# Patient Record
Sex: FEMALE | Race: WHITE | NOT HISPANIC OR LATINO | Employment: FULL TIME | URBAN - METROPOLITAN AREA
[De-identification: names, ages, dates, MRNs, and addresses within clinical notes are randomized per-mention and may not be internally consistent; named-entity substitution may affect disease eponyms.]

---

## 2018-01-25 ENCOUNTER — TRANSCRIBE ORDERS (OUTPATIENT)
Dept: ADMINISTRATIVE | Facility: HOSPITAL | Age: 58
End: 2018-01-25

## 2018-01-25 DIAGNOSIS — R10.10 PAIN OF UPPER ABDOMEN: Primary | ICD-10-CM

## 2018-01-25 DIAGNOSIS — R11.0 NAUSEA: ICD-10-CM

## 2018-01-29 ENCOUNTER — TRANSCRIBE ORDERS (OUTPATIENT)
Dept: ADMINISTRATIVE | Facility: HOSPITAL | Age: 58
End: 2018-01-29

## 2018-01-29 DIAGNOSIS — M25.512 ACUTE PAIN OF LEFT SHOULDER: Primary | ICD-10-CM

## 2018-02-01 ENCOUNTER — HOSPITAL ENCOUNTER (OUTPATIENT)
Dept: CT IMAGING | Facility: HOSPITAL | Age: 58
Discharge: HOME | End: 2018-02-01
Attending: ORTHOPAEDIC SURGERY | Admitting: ORTHOPAEDIC SURGERY

## 2018-02-01 DIAGNOSIS — M25.512 ACUTE PAIN OF LEFT SHOULDER: ICD-10-CM

## 2018-02-01 PROCEDURE — 73200 CT UPPER EXTREMITY W/O DYE: CPT

## 2018-03-30 ENCOUNTER — HOSPITAL ENCOUNTER (OUTPATIENT)
Dept: GENERAL RADIOLOGY | Facility: HOSPITAL | Age: 58
Discharge: HOME | End: 2018-03-30
Admitting: ORTHOPAEDIC SURGERY

## 2018-03-30 ENCOUNTER — HOSPITAL ENCOUNTER (OUTPATIENT)
Dept: GENERAL RADIOLOGY | Facility: HOSPITAL | Age: 58
Discharge: HOME | End: 2018-03-30

## 2018-03-30 ENCOUNTER — APPOINTMENT (OUTPATIENT)
Dept: PREADMISSION TESTING | Facility: HOSPITAL | Age: 58
End: 2018-03-30

## 2018-03-30 VITALS
WEIGHT: 134 LBS | DIASTOLIC BLOOD PRESSURE: 79 MMHG | OXYGEN SATURATION: 99 % | TEMPERATURE: 97.8 F | RESPIRATION RATE: 16 BRPM | HEART RATE: 78 BPM | HEIGHT: 65 IN | BODY MASS INDEX: 22.33 KG/M2 | SYSTOLIC BLOOD PRESSURE: 107 MMHG

## 2018-03-30 PROCEDURE — 71046 X-RAY EXAM CHEST 2 VIEWS: CPT

## 2018-03-30 PROCEDURE — 73030 X-RAY EXAM OF SHOULDER: CPT

## 2018-03-30 RX ORDER — CLONAZEPAM 1 MG/1
1 TABLET ORAL 2 TIMES DAILY PRN
COMMUNITY
End: 2021-07-09

## 2018-03-30 RX ORDER — MELATONIN
1000 EVERY MORNING
COMMUNITY
End: 2021-07-09

## 2018-03-30 RX ORDER — UBIDECARENONE 75 MG
50 CAPSULE ORAL EVERY MORNING
COMMUNITY
End: 2021-07-09

## 2018-03-31 PROBLEM — M19.012 PRIMARY OSTEOARTHRITIS OF LEFT SHOULDER: Chronic | Status: ACTIVE | Noted: 2018-03-31

## 2018-04-02 DIAGNOSIS — M19.90 OTHER TYPE OF OSTEOARTHRITIS, UNSPECIFIED SITE: Primary | ICD-10-CM

## 2018-04-02 LAB
APTT PPP: 28.6 SECONDS (ref 22.7–35.4)
INR PPP: 1.04 (ref 0.9–1.1)
PROTHROMBIN TIME: 13.4 SECONDS (ref 11.7–14.2)

## 2018-04-02 PROCEDURE — 85730 THROMBOPLASTIN TIME PARTIAL: CPT | Performed by: ORTHOPAEDIC SURGERY

## 2018-04-02 PROCEDURE — 36415 COLL VENOUS BLD VENIPUNCTURE: CPT

## 2018-04-02 PROCEDURE — 85610 PROTHROMBIN TIME: CPT | Performed by: ORTHOPAEDIC SURGERY

## 2018-04-09 RX ORDER — CEFAZOLIN SODIUM 2 G/100ML
2 INJECTION, SOLUTION INTRAVENOUS ONCE
Status: DISCONTINUED | OUTPATIENT
Start: 2018-04-09 | End: 2018-04-09 | Stop reason: SDUPTHER

## 2018-04-10 ENCOUNTER — HOSPITAL ENCOUNTER (INPATIENT)
Facility: HOSPITAL | Age: 58
LOS: 1 days | Discharge: HOME | End: 2018-04-11
Attending: ORTHOPAEDIC SURGERY | Admitting: ORTHOPAEDIC SURGERY

## 2018-04-10 ENCOUNTER — APPOINTMENT (OUTPATIENT)
Dept: GENERAL RADIOLOGY | Facility: HOSPITAL | Age: 58
End: 2018-04-10

## 2018-04-10 ENCOUNTER — ANESTHESIA (OUTPATIENT)
Dept: PERIOP | Facility: HOSPITAL | Age: 58
End: 2018-04-10

## 2018-04-10 ENCOUNTER — ANESTHESIA EVENT (OUTPATIENT)
Dept: PERIOP | Facility: HOSPITAL | Age: 58
End: 2018-04-10

## 2018-04-10 DIAGNOSIS — M19.012 PRIMARY OSTEOARTHRITIS OF LEFT SHOULDER: Primary | Chronic | ICD-10-CM

## 2018-04-10 PROBLEM — F41.9 ANXIETY: Status: ACTIVE | Noted: 2018-04-10

## 2018-04-10 PROBLEM — Z98.890 POSTOPERATIVE STATE: Status: ACTIVE | Noted: 2018-04-10

## 2018-04-10 PROCEDURE — C1713 ANCHOR/SCREW BN/BN,TIS/BN: HCPCS | Performed by: ORTHOPAEDIC SURGERY

## 2018-04-10 PROCEDURE — 25010000003 CEFAZOLIN IN DEXTROSE 2-4 GM/100ML-% SOLUTION: Performed by: ORTHOPAEDIC SURGERY

## 2018-04-10 PROCEDURE — C1776 JOINT DEVICE (IMPLANTABLE): HCPCS | Performed by: ORTHOPAEDIC SURGERY

## 2018-04-10 PROCEDURE — 25010000002 SUCCINYLCHOLINE PER 20 MG: Performed by: NURSE ANESTHETIST, CERTIFIED REGISTERED

## 2018-04-10 PROCEDURE — 25010000002 DEXAMETHASONE PER 1 MG: Performed by: NURSE ANESTHETIST, CERTIFIED REGISTERED

## 2018-04-10 PROCEDURE — 25010000002 ONDANSETRON PER 1 MG: Performed by: NURSE ANESTHETIST, CERTIFIED REGISTERED

## 2018-04-10 PROCEDURE — 25010000002 FENTANYL CITRATE (PF) 100 MCG/2ML SOLUTION: Performed by: ANESTHESIOLOGY

## 2018-04-10 PROCEDURE — 25010000002 MIDAZOLAM PER 1 MG: Performed by: ANESTHESIOLOGY

## 2018-04-10 PROCEDURE — 73020 X-RAY EXAM OF SHOULDER: CPT

## 2018-04-10 PROCEDURE — 0RRK0JZ REPLACEMENT OF LEFT SHOULDER JOINT WITH SYNTHETIC SUBSTITUTE, OPEN APPROACH: ICD-10-PCS | Performed by: ORTHOPAEDIC SURGERY

## 2018-04-10 PROCEDURE — 25010000002 PROPOFOL 10 MG/ML EMULSION: Performed by: NURSE ANESTHETIST, CERTIFIED REGISTERED

## 2018-04-10 PROCEDURE — 25010000002 PHENYLEPHRINE PER 1 ML: Performed by: NURSE ANESTHETIST, CERTIFIED REGISTERED

## 2018-04-10 DEVICE — IMPLANTABLE DEVICE: Type: IMPLANTABLE DEVICE | Site: SHOULDER | Status: FUNCTIONAL

## 2018-04-10 DEVICE — IMPLANTABLE DEVICE
Type: IMPLANTABLE DEVICE | Site: SHOULDER | Status: FUNCTIONAL
Brand: FLEX SHOULDER SYSTEM

## 2018-04-10 DEVICE — HALF DOSE BONE CEMENT, 10 PACK CATALOG NUMBER IS 6188-1-010
Type: IMPLANTABLE DEVICE | Site: SHOULDER | Status: FUNCTIONAL
Brand: SIMPLEX

## 2018-04-10 DEVICE — IMPLANTABLE DEVICE
Type: IMPLANTABLE DEVICE | Site: SHOULDER | Status: FUNCTIONAL
Brand: AEQUALIS™ ASCEND™ FLEX

## 2018-04-10 RX ORDER — MEPERIDINE HYDROCHLORIDE 25 MG/ML
12.5 INJECTION INTRAMUSCULAR; INTRAVENOUS; SUBCUTANEOUS
Status: DISCONTINUED | OUTPATIENT
Start: 2018-04-10 | End: 2018-04-10 | Stop reason: HOSPADM

## 2018-04-10 RX ORDER — PROMETHAZINE HYDROCHLORIDE 25 MG/1
25 TABLET ORAL ONCE AS NEEDED
Status: DISCONTINUED | OUTPATIENT
Start: 2018-04-10 | End: 2018-04-10 | Stop reason: HOSPADM

## 2018-04-10 RX ORDER — MIDAZOLAM HYDROCHLORIDE 1 MG/ML
1 INJECTION INTRAMUSCULAR; INTRAVENOUS
Status: DISCONTINUED | OUTPATIENT
Start: 2018-04-10 | End: 2018-04-10 | Stop reason: HOSPADM

## 2018-04-10 RX ORDER — HYDROCODONE BITARTRATE AND ACETAMINOPHEN 7.5; 325 MG/1; MG/1
2 TABLET ORAL EVERY 4 HOURS PRN
Status: DISCONTINUED | OUTPATIENT
Start: 2018-04-10 | End: 2018-04-11 | Stop reason: HOSPADM

## 2018-04-10 RX ORDER — ROCURONIUM BROMIDE 10 MG/ML
INJECTION, SOLUTION INTRAVENOUS AS NEEDED
Status: DISCONTINUED | OUTPATIENT
Start: 2018-04-10 | End: 2018-04-10 | Stop reason: SURG

## 2018-04-10 RX ORDER — PROPOFOL 10 MG/ML
VIAL (ML) INTRAVENOUS AS NEEDED
Status: DISCONTINUED | OUTPATIENT
Start: 2018-04-10 | End: 2018-04-10 | Stop reason: SURG

## 2018-04-10 RX ORDER — ONDANSETRON 4 MG/1
4 TABLET, FILM COATED ORAL EVERY 6 HOURS PRN
Status: DISCONTINUED | OUTPATIENT
Start: 2018-04-10 | End: 2018-04-11 | Stop reason: HOSPADM

## 2018-04-10 RX ORDER — ONDANSETRON 2 MG/ML
INJECTION INTRAMUSCULAR; INTRAVENOUS AS NEEDED
Status: DISCONTINUED | OUTPATIENT
Start: 2018-04-10 | End: 2018-04-10 | Stop reason: SURG

## 2018-04-10 RX ORDER — LABETALOL HYDROCHLORIDE 5 MG/ML
5 INJECTION, SOLUTION INTRAVENOUS
Status: DISCONTINUED | OUTPATIENT
Start: 2018-04-10 | End: 2018-04-10 | Stop reason: HOSPADM

## 2018-04-10 RX ORDER — PROMETHAZINE HYDROCHLORIDE 25 MG/1
25 SUPPOSITORY RECTAL ONCE AS NEEDED
Status: DISCONTINUED | OUTPATIENT
Start: 2018-04-10 | End: 2018-04-10 | Stop reason: HOSPADM

## 2018-04-10 RX ORDER — SODIUM CHLORIDE 0.9 % (FLUSH) 0.9 %
1-10 SYRINGE (ML) INJECTION AS NEEDED
Status: DISCONTINUED | OUTPATIENT
Start: 2018-04-10 | End: 2018-04-10 | Stop reason: HOSPADM

## 2018-04-10 RX ORDER — IPRATROPIUM BROMIDE AND ALBUTEROL SULFATE 2.5; .5 MG/3ML; MG/3ML
3 SOLUTION RESPIRATORY (INHALATION) ONCE AS NEEDED
Status: DISCONTINUED | OUTPATIENT
Start: 2018-04-10 | End: 2018-04-10 | Stop reason: HOSPADM

## 2018-04-10 RX ORDER — DOCUSATE SODIUM 100 MG/1
100 CAPSULE, LIQUID FILLED ORAL 2 TIMES DAILY PRN
Status: DISCONTINUED | OUTPATIENT
Start: 2018-04-10 | End: 2018-04-11 | Stop reason: HOSPADM

## 2018-04-10 RX ORDER — HYDROCODONE BITARTRATE AND ACETAMINOPHEN 7.5; 325 MG/1; MG/1
1 TABLET ORAL ONCE AS NEEDED
Status: DISCONTINUED | OUTPATIENT
Start: 2018-04-10 | End: 2018-04-10 | Stop reason: HOSPADM

## 2018-04-10 RX ORDER — SODIUM CHLORIDE, SODIUM LACTATE, POTASSIUM CHLORIDE, CALCIUM CHLORIDE 600; 310; 30; 20 MG/100ML; MG/100ML; MG/100ML; MG/100ML
100 INJECTION, SOLUTION INTRAVENOUS CONTINUOUS
Status: DISCONTINUED | OUTPATIENT
Start: 2018-04-10 | End: 2018-04-11 | Stop reason: HOSPADM

## 2018-04-10 RX ORDER — PROMETHAZINE HYDROCHLORIDE 25 MG/ML
5 INJECTION, SOLUTION INTRAMUSCULAR; INTRAVENOUS
Status: DISCONTINUED | OUTPATIENT
Start: 2018-04-10 | End: 2018-04-10 | Stop reason: HOSPADM

## 2018-04-10 RX ORDER — HYDROCODONE BITARTRATE AND ACETAMINOPHEN 7.5; 325 MG/1; MG/1
1 TABLET ORAL EVERY 4 HOURS PRN
Qty: 60 TABLET | Refills: 0 | Status: SHIPPED | OUTPATIENT
Start: 2018-04-10 | End: 2018-04-20

## 2018-04-10 RX ORDER — SODIUM CHLORIDE, SODIUM LACTATE, POTASSIUM CHLORIDE, CALCIUM CHLORIDE 600; 310; 30; 20 MG/100ML; MG/100ML; MG/100ML; MG/100ML
9 INJECTION, SOLUTION INTRAVENOUS CONTINUOUS
Status: DISCONTINUED | OUTPATIENT
Start: 2018-04-10 | End: 2018-04-10 | Stop reason: HOSPADM

## 2018-04-10 RX ORDER — DIPHENHYDRAMINE HCL 25 MG
25 CAPSULE ORAL EVERY 6 HOURS PRN
Status: DISCONTINUED | OUTPATIENT
Start: 2018-04-10 | End: 2018-04-11 | Stop reason: HOSPADM

## 2018-04-10 RX ORDER — PROMETHAZINE HYDROCHLORIDE 25 MG/ML
12.5 INJECTION, SOLUTION INTRAMUSCULAR; INTRAVENOUS ONCE AS NEEDED
Status: DISCONTINUED | OUTPATIENT
Start: 2018-04-10 | End: 2018-04-10 | Stop reason: HOSPADM

## 2018-04-10 RX ORDER — HYDROCODONE BITARTRATE AND ACETAMINOPHEN 7.5; 325 MG/1; MG/1
1 TABLET ORAL EVERY 4 HOURS PRN
Status: DISCONTINUED | OUTPATIENT
Start: 2018-04-10 | End: 2018-04-11 | Stop reason: HOSPADM

## 2018-04-10 RX ORDER — MIDAZOLAM HYDROCHLORIDE 1 MG/ML
2 INJECTION INTRAMUSCULAR; INTRAVENOUS
Status: DISCONTINUED | OUTPATIENT
Start: 2018-04-10 | End: 2018-04-10 | Stop reason: HOSPADM

## 2018-04-10 RX ORDER — ONDANSETRON 2 MG/ML
4 INJECTION INTRAMUSCULAR; INTRAVENOUS ONCE AS NEEDED
Status: DISCONTINUED | OUTPATIENT
Start: 2018-04-10 | End: 2018-04-10 | Stop reason: HOSPADM

## 2018-04-10 RX ORDER — HYDROMORPHONE HCL 110MG/55ML
0.5 PATIENT CONTROLLED ANALGESIA SYRINGE INTRAVENOUS
Status: DISCONTINUED | OUTPATIENT
Start: 2018-04-10 | End: 2018-04-10 | Stop reason: HOSPADM

## 2018-04-10 RX ORDER — FENTANYL CITRATE 50 UG/ML
50 INJECTION, SOLUTION INTRAMUSCULAR; INTRAVENOUS
Status: DISCONTINUED | OUTPATIENT
Start: 2018-04-10 | End: 2018-04-10 | Stop reason: HOSPADM

## 2018-04-10 RX ORDER — EPHEDRINE SULFATE 50 MG/ML
5 INJECTION, SOLUTION INTRAVENOUS ONCE AS NEEDED
Status: DISCONTINUED | OUTPATIENT
Start: 2018-04-10 | End: 2018-04-10 | Stop reason: HOSPADM

## 2018-04-10 RX ORDER — FLUMAZENIL 0.1 MG/ML
0.2 INJECTION INTRAVENOUS AS NEEDED
Status: DISCONTINUED | OUTPATIENT
Start: 2018-04-10 | End: 2018-04-10 | Stop reason: HOSPADM

## 2018-04-10 RX ORDER — NALOXONE HCL 0.4 MG/ML
0.2 VIAL (ML) INJECTION AS NEEDED
Status: DISCONTINUED | OUTPATIENT
Start: 2018-04-10 | End: 2018-04-10 | Stop reason: HOSPADM

## 2018-04-10 RX ORDER — SUCCINYLCHOLINE CHLORIDE 20 MG/ML
INJECTION INTRAMUSCULAR; INTRAVENOUS AS NEEDED
Status: DISCONTINUED | OUTPATIENT
Start: 2018-04-10 | End: 2018-04-10 | Stop reason: SURG

## 2018-04-10 RX ORDER — ONDANSETRON 2 MG/ML
4 INJECTION INTRAMUSCULAR; INTRAVENOUS EVERY 6 HOURS PRN
Status: DISCONTINUED | OUTPATIENT
Start: 2018-04-10 | End: 2018-04-11 | Stop reason: HOSPADM

## 2018-04-10 RX ORDER — CLONAZEPAM 0.5 MG/1
1 TABLET ORAL 2 TIMES DAILY PRN
Status: DISCONTINUED | OUTPATIENT
Start: 2018-04-10 | End: 2018-04-11 | Stop reason: HOSPADM

## 2018-04-10 RX ORDER — MAGNESIUM HYDROXIDE 1200 MG/15ML
LIQUID ORAL AS NEEDED
Status: DISCONTINUED | OUTPATIENT
Start: 2018-04-10 | End: 2018-04-10 | Stop reason: HOSPADM

## 2018-04-10 RX ORDER — CEFAZOLIN SODIUM 2 G/100ML
2 INJECTION, SOLUTION INTRAVENOUS ONCE
Status: COMPLETED | OUTPATIENT
Start: 2018-04-10 | End: 2018-04-10

## 2018-04-10 RX ORDER — UREA 10 %
1 LOTION (ML) TOPICAL NIGHTLY PRN
Status: DISCONTINUED | OUTPATIENT
Start: 2018-04-10 | End: 2018-04-11 | Stop reason: HOSPADM

## 2018-04-10 RX ORDER — ACETAMINOPHEN 325 MG/1
325 TABLET ORAL EVERY 4 HOURS PRN
Status: DISCONTINUED | OUTPATIENT
Start: 2018-04-10 | End: 2018-04-11 | Stop reason: HOSPADM

## 2018-04-10 RX ORDER — BUPIVACAINE HYDROCHLORIDE AND EPINEPHRINE 5; 5 MG/ML; UG/ML
INJECTION, SOLUTION EPIDURAL; INTRACAUDAL; PERINEURAL AS NEEDED
Status: DISCONTINUED | OUTPATIENT
Start: 2018-04-10 | End: 2018-04-10 | Stop reason: SURG

## 2018-04-10 RX ORDER — NALOXONE HCL 0.4 MG/ML
0.1 VIAL (ML) INJECTION
Status: DISCONTINUED | OUTPATIENT
Start: 2018-04-10 | End: 2018-04-11 | Stop reason: HOSPADM

## 2018-04-10 RX ORDER — PROMETHAZINE HYDROCHLORIDE 25 MG/1
12.5 TABLET ORAL ONCE AS NEEDED
Status: DISCONTINUED | OUTPATIENT
Start: 2018-04-10 | End: 2018-04-10 | Stop reason: HOSPADM

## 2018-04-10 RX ORDER — GLYCOPYRROLATE 0.2 MG/ML
INJECTION INTRAMUSCULAR; INTRAVENOUS AS NEEDED
Status: DISCONTINUED | OUTPATIENT
Start: 2018-04-10 | End: 2018-04-10 | Stop reason: SURG

## 2018-04-10 RX ORDER — SCOLOPAMINE TRANSDERMAL SYSTEM 1 MG/1
1 PATCH, EXTENDED RELEASE TRANSDERMAL ONCE
Status: DISCONTINUED | OUTPATIENT
Start: 2018-04-10 | End: 2018-04-11

## 2018-04-10 RX ORDER — CEFAZOLIN SODIUM 2 G/100ML
2 INJECTION, SOLUTION INTRAVENOUS EVERY 8 HOURS
Status: COMPLETED | OUTPATIENT
Start: 2018-04-10 | End: 2018-04-10

## 2018-04-10 RX ORDER — TRANEXAMIC ACID 100 MG/ML
INJECTION, SOLUTION INTRAVENOUS AS NEEDED
Status: DISCONTINUED | OUTPATIENT
Start: 2018-04-10 | End: 2018-04-10 | Stop reason: SURG

## 2018-04-10 RX ORDER — DIPHENHYDRAMINE HYDROCHLORIDE 50 MG/ML
12.5 INJECTION INTRAMUSCULAR; INTRAVENOUS
Status: DISCONTINUED | OUTPATIENT
Start: 2018-04-10 | End: 2018-04-10 | Stop reason: HOSPADM

## 2018-04-10 RX ORDER — LIDOCAINE HYDROCHLORIDE 20 MG/ML
INJECTION, SOLUTION INFILTRATION; PERINEURAL AS NEEDED
Status: DISCONTINUED | OUTPATIENT
Start: 2018-04-10 | End: 2018-04-10 | Stop reason: SURG

## 2018-04-10 RX ORDER — SODIUM CHLORIDE 9 MG/ML
100 INJECTION, SOLUTION INTRAVENOUS CONTINUOUS
Status: ACTIVE | OUTPATIENT
Start: 2018-04-10 | End: 2018-04-11

## 2018-04-10 RX ORDER — ASPIRIN 325 MG
325 TABLET, DELAYED RELEASE (ENTERIC COATED) ORAL DAILY
Status: DISCONTINUED | OUTPATIENT
Start: 2018-04-11 | End: 2018-04-11 | Stop reason: HOSPADM

## 2018-04-10 RX ORDER — HYDRALAZINE HYDROCHLORIDE 20 MG/ML
5 INJECTION INTRAMUSCULAR; INTRAVENOUS
Status: DISCONTINUED | OUTPATIENT
Start: 2018-04-10 | End: 2018-04-10 | Stop reason: HOSPADM

## 2018-04-10 RX ORDER — ONDANSETRON 4 MG/1
4 TABLET, ORALLY DISINTEGRATING ORAL EVERY 6 HOURS PRN
Status: DISCONTINUED | OUTPATIENT
Start: 2018-04-10 | End: 2018-04-11 | Stop reason: HOSPADM

## 2018-04-10 RX ORDER — FAMOTIDINE 10 MG/ML
20 INJECTION, SOLUTION INTRAVENOUS ONCE
Status: COMPLETED | OUTPATIENT
Start: 2018-04-10 | End: 2018-04-10

## 2018-04-10 RX ORDER — DEXAMETHASONE SODIUM PHOSPHATE 10 MG/ML
INJECTION INTRAMUSCULAR; INTRAVENOUS AS NEEDED
Status: DISCONTINUED | OUTPATIENT
Start: 2018-04-10 | End: 2018-04-10 | Stop reason: SURG

## 2018-04-10 RX ORDER — HYDROMORPHONE HYDROCHLORIDE 1 MG/ML
0.5 INJECTION, SOLUTION INTRAMUSCULAR; INTRAVENOUS; SUBCUTANEOUS
Status: DISCONTINUED | OUTPATIENT
Start: 2018-04-10 | End: 2018-04-11 | Stop reason: HOSPADM

## 2018-04-10 RX ORDER — DIPHENHYDRAMINE HYDROCHLORIDE 50 MG/ML
25 INJECTION INTRAMUSCULAR; INTRAVENOUS EVERY 6 HOURS PRN
Status: DISCONTINUED | OUTPATIENT
Start: 2018-04-10 | End: 2018-04-11 | Stop reason: HOSPADM

## 2018-04-10 RX ORDER — LIDOCAINE HYDROCHLORIDE 10 MG/ML
0.5 INJECTION, SOLUTION EPIDURAL; INFILTRATION; INTRACAUDAL; PERINEURAL ONCE AS NEEDED
Status: DISCONTINUED | OUTPATIENT
Start: 2018-04-10 | End: 2018-04-10 | Stop reason: HOSPADM

## 2018-04-10 RX ORDER — BISACODYL 5 MG/1
10 TABLET, DELAYED RELEASE ORAL DAILY PRN
Status: DISCONTINUED | OUTPATIENT
Start: 2018-04-10 | End: 2018-04-11 | Stop reason: HOSPADM

## 2018-04-10 RX ADMIN — PHENYLEPHRINE HYDROCHLORIDE 50 MCG: 10 INJECTION INTRAVENOUS at 08:55

## 2018-04-10 RX ADMIN — PHENYLEPHRINE HYDROCHLORIDE 50 MCG: 10 INJECTION INTRAVENOUS at 08:40

## 2018-04-10 RX ADMIN — ROCURONIUM BROMIDE 5 MG: 10 INJECTION INTRAVENOUS at 07:30

## 2018-04-10 RX ADMIN — PHENYLEPHRINE HYDROCHLORIDE 50 MCG: 10 INJECTION INTRAVENOUS at 10:18

## 2018-04-10 RX ADMIN — CEFAZOLIN SODIUM 2 G: 2 INJECTION, SOLUTION INTRAVENOUS at 16:06

## 2018-04-10 RX ADMIN — PHENYLEPHRINE HYDROCHLORIDE 50 MCG: 10 INJECTION INTRAVENOUS at 09:55

## 2018-04-10 RX ADMIN — SODIUM CHLORIDE 100 ML/HR: 9 INJECTION, SOLUTION INTRAVENOUS at 14:00

## 2018-04-10 RX ADMIN — FAMOTIDINE 20 MG: 10 INJECTION, SOLUTION INTRAVENOUS at 06:35

## 2018-04-10 RX ADMIN — GLYCOPYRROLATE 0.2 MG: 0.2 INJECTION INTRAMUSCULAR; INTRAVENOUS at 07:35

## 2018-04-10 RX ADMIN — ROCURONIUM BROMIDE 15 MG: 10 INJECTION INTRAVENOUS at 08:00

## 2018-04-10 RX ADMIN — Medication 1 MG: at 06:41

## 2018-04-10 RX ADMIN — SODIUM CHLORIDE, POTASSIUM CHLORIDE, SODIUM LACTATE AND CALCIUM CHLORIDE: 600; 310; 30; 20 INJECTION, SOLUTION INTRAVENOUS at 07:23

## 2018-04-10 RX ADMIN — LIDOCAINE HYDROCHLORIDE 25 MG: 20 INJECTION, SOLUTION INFILTRATION; PERINEURAL at 07:30

## 2018-04-10 RX ADMIN — PHENYLEPHRINE HYDROCHLORIDE 50 MCG: 10 INJECTION INTRAVENOUS at 09:25

## 2018-04-10 RX ADMIN — CEFAZOLIN SODIUM 2 G: 2 INJECTION, SOLUTION INTRAVENOUS at 07:35

## 2018-04-10 RX ADMIN — PHENYLEPHRINE HYDROCHLORIDE 50 MCG: 10 INJECTION INTRAVENOUS at 09:15

## 2018-04-10 RX ADMIN — CEFAZOLIN SODIUM 2 G: 2 INJECTION, SOLUTION INTRAVENOUS at 23:20

## 2018-04-10 RX ADMIN — PHENYLEPHRINE HYDROCHLORIDE 50 MCG: 10 INJECTION INTRAVENOUS at 09:00

## 2018-04-10 RX ADMIN — TRANEXAMIC ACID 1000 MG: 100 INJECTION, SOLUTION INTRAVENOUS at 07:35

## 2018-04-10 RX ADMIN — SODIUM CHLORIDE, POTASSIUM CHLORIDE, SODIUM LACTATE AND CALCIUM CHLORIDE: 600; 310; 30; 20 INJECTION, SOLUTION INTRAVENOUS at 08:30

## 2018-04-10 RX ADMIN — PROPOFOL 120 MG: 10 INJECTION, EMULSION INTRAVENOUS at 07:30

## 2018-04-10 RX ADMIN — ONDANSETRON 4 MG: 2 INJECTION INTRAMUSCULAR; INTRAVENOUS at 10:27

## 2018-04-10 RX ADMIN — ROCURONIUM BROMIDE 10 MG: 10 INJECTION INTRAVENOUS at 09:45

## 2018-04-10 RX ADMIN — ROCURONIUM BROMIDE 10 MG: 10 INJECTION INTRAVENOUS at 08:47

## 2018-04-10 RX ADMIN — FENTANYL CITRATE 25 MCG: 50 INJECTION INTRAMUSCULAR; INTRAVENOUS at 06:35

## 2018-04-10 RX ADMIN — PHENYLEPHRINE HYDROCHLORIDE 50 MCG: 10 INJECTION INTRAVENOUS at 09:30

## 2018-04-10 RX ADMIN — PHENYLEPHRINE HYDROCHLORIDE 50 MCG: 10 INJECTION INTRAVENOUS at 09:11

## 2018-04-10 RX ADMIN — SCOPALAMINE 1 PATCH: 1 PATCH, EXTENDED RELEASE TRANSDERMAL at 06:36

## 2018-04-10 RX ADMIN — SUCCINYLCHOLINE CHLORIDE 100 MG: 20 INJECTION, SOLUTION INTRAMUSCULAR; INTRAVENOUS; PARENTERAL at 07:30

## 2018-04-10 RX ADMIN — FENTANYL CITRATE 100 MCG: 50 INJECTION INTRAMUSCULAR; INTRAVENOUS at 06:41

## 2018-04-10 RX ADMIN — BUPIVACAINE HYDROCHLORIDE AND EPINEPHRINE BITARTRATE 20 ML: 5; .0091 INJECTION, SOLUTION EPIDURAL; INTRACAUDAL; PERINEURAL at 06:38

## 2018-04-10 RX ADMIN — PHENYLEPHRINE HYDROCHLORIDE 50 MCG: 10 INJECTION INTRAVENOUS at 08:35

## 2018-04-10 RX ADMIN — PHENYLEPHRINE HYDROCHLORIDE 50 MCG: 10 INJECTION INTRAVENOUS at 09:05

## 2018-04-10 RX ADMIN — PHENYLEPHRINE HYDROCHLORIDE 50 MCG: 10 INJECTION INTRAVENOUS at 08:50

## 2018-04-10 RX ADMIN — DEXAMETHASONE SODIUM PHOSPHATE 8 MG: 10 INJECTION INTRAMUSCULAR; INTRAVENOUS at 07:35

## 2018-04-10 RX ADMIN — HYDROCODONE BITARTRATE AND ACETAMINOPHEN 2 TABLET: 7.5; 325 TABLET ORAL at 23:42

## 2018-04-10 RX ADMIN — SUGAMMADEX 122 MG: 100 INJECTION, SOLUTION INTRAVENOUS at 10:27

## 2018-04-10 RX ADMIN — Medication 1 MG: at 06:35

## 2018-04-10 NOTE — OP NOTE
ORTHOPAEDIC OPERATIVE NOTE    Patient: Jeimma Gaspar  YOB: 1960    Medical Record Number: [id number]    Attending Physician: Trinidad Soto, *    Primary Care Physician: Forest Grullon Jr., MD    DATE OF PROCEDURE: 4/10/2018    SURGEON: Trinidad Soto MD        PREOPERATIVE DIAGNOSIS:  Degenerative osteoarthritis LEFT  shoulder.    POSTOPERATIVE DIAGNOSIS:   Degenerative osteoarthritis LEFT  shoulder.    PROCEDURE PERFORMED: LT TOTAL SHOULDER ARTHROPLASTY  by utilizing a deltopectoral approach with Tornier ( S & N) Aequalis Perform + Cortiloc Glenoid Augment UHMPE Size Small Left Angle 15 degrees, Standard PTC Humeral stem Ti , Size 5 B angle 132.5 degree, Length 82 mm , humeral head 42 mm , Height 18 mm, Eccentric high.     SURGEON: Trinidad Soto MD     ASSISTANT: Wilfredo Bain MD, Fellow    ARNOL Yeung    The services of a skilled  first assist were necessary for performing the procedure safely and expeditiously.  The first assist was present for the entire duration of the case and helped with positioning, retraction and closure of the incision.      ANESTHESIA:  Regional Block with General anaesthesia.     ESTIMATED BLOOD LOSS: 150 mL    SPECIMENS:  Nil.     COMPLICATIONS:  Nil.     DRAINS:  Nil.     INDICATIONS: The patient is a 57 y.o. female  Who   presented to my office with progressively worsening pain in the shoulder. The patient has reached a point of disability with difficulty with activities of daily living. The patient failed nonoperative management.   Treatment options and alternatives were discussed in detail with the patient who is indicated for a total shoulder arthroplasty. Likely risks and benefits of the procedure, including but not limited to infection, recurrent dislocation, stiffness, possibility of injury to tendons, ligaments, nerves or vessels and risk for mortality and morbidity have been  discussed in detail. Despite the risks involved, the patient elected to proceed and informed consent was obtained and the patient was scheduled for surgery. The patient was seen in the preoperative holding area and the operative site was marked.   CT was done and she had significant posterior glenoid wear and posterior subluxation of the humeral head.     DESCRIPTION OF PROCEDURE:   The patient was transferred to [facility] operating room. Preoperative antibiotics in the form of Kefzol intravenously was infused prior to the incision and prior to the tourniquet placement according to the SCIP protocol. A surgical time out was done with the team and the correct patient, surgical side and site were identified.     The patient received tranexamic acid intravenously prior to the incision.  After achieving adequate general anesthesia, the patient was placed in a semi-beachchair position. Shoulder was prepped and draped in the usual sterile fashion. A skin incision was made for a deltopectoral approach. Skin and subcutaneous tissue were incised and the interval between the deltoid and pectoralis major was developed . The cephalic vein was retracted laterally along with the deltoid. The vein was ligated. Subdeltoid space was exposed. There were adhesions in the subdeltoid space.  The subscapularis and supraspinatus tendon was identified. The  subscapularis tendon was tagged with Fiber Wire suture. Biceps tendon was identified.  Conjoined tendon was identified.  The subscapular vessels at the lower border of the subscapularis tendon were identified and electrocautery was utilized to obliterate them.  Biceps tendon was released tenotomized and tenodesed to the pectoralis major tendon.  The most proximal 1 cm insertion of the pectoralis major tendon was released from the humeral stem.  Rotator cuff interval was exposed.  Subscapularis tendon was peeled from the lesser tuberosity along with the capsule as a  single layer and with gentle external rotation, extension, and adduction.  Further release of the capsule was done along the inferior aspect of the humeral head and the humeral head was dislocated.  Inspection of the articular surface confirmed presence of complete loss of articular cartilage with exposed areas of subchondral eburnated bone.  There were peripheral osteophytes, especially on the inferior aspect of the humeral head.  The osteophytes were resected with the help of a rongeur.      Humeral head was resected utilizing a resecting guide with an inclination of 132° and retroversion, 30°.  The resected humeral head was sized, both for diameter and thickness.  The medullary canal was entered.  About 8 mm deeper to the deepest portion of the bicipital tuberosity.  The canal was gradually reamed, followed by serial broaching.  Adequate.  Fit was found to be obtained with a size 5 broach. Two drill holes were made in the proximal humerus at the insertion of the subscapularis tendon and  Two # 2 Fiber Wire sutures  were passed.  A Standard Aequalis Ascend Flex   humeral stem size 5  was press-fit with the correct inclination and version.  There was excellent scratch fit.  A protective plate was placed over the proximal end of the humeral cut surface and attention was directed to the glenoid.    Retractors were placed and glenoid was exposed. There was posterior glenoid wear. Based on CT scan and templating soft ware I elected to use a 15 degree angle posterior augmented glenoid.   Labrum was excised. The biceps tendon stump from the glenoid was excised. The central portion of the glenoid was identified and a   guide pin was placed into the center of the glenoid. An anterior pin hole was drilled followed by placing a  anteriorly and a power reamer was utilized posteriorly  down to bleeding bone and the central peg hole was drilled utilizing a glenoid guide. Next the three pegs were drilled and trial  glenoid was seated. The trial was found to be satisfactory. The  Glenoid, Small  was selected and cemented.  The peripheral 3 pegs were cemented into position with the central titanium post press-fit and held without movement till cement was set. The glenoid was found to be stable and satisfactory.     The trial humeral head was seated with the offset. There was adequate coverage of the proximal humerus restoring the gothic arch. The offset was locked.. The trial reduction was performed. The reduction was found to be satisfactory with a good range of motion in flexion, abduction and internal and external rotations. There was no sign of any impingement. Soft tissue tension was found to be satisfactory.  Having been satisfied with the trial, The trial was dislocated. Trial implants were removed. The humeral head was assembled to 6 O clock position  on the back table and the humeral head 42 mm X 18 mm was seated.  The shoulder joint was then reduced. Again there was good stability of the joint without any impingement.     Shoulder joint was thoroughly irrigated with saline. The periarticular tissue was infiltrated with Exparel.The subscapularis tendon  was re-anchored to the lesser tuberosity  with the help of the previously placed Fiber Wire suture. Soft tissue hemostasis was secured. The incision was thoroughly irrigated with saline. The rotator cuff interval was repaired with  Fiber wire suture. The sponge and needle count was found to be correct. Incision was closed in layers. Sterile dressings were placed and the patient was given a shoulder immobilizer.    The patient tolerated the procedure well and is being transferred to the floor  for postoperative antibiotics according to the SCIP protocol for 2 more doses in the first twenty four hours. The patient will be mobilized in am with physical therapy.    I discussed the satisfactory performance of the procedure with the patient's family and discussed with them The  postoperative management.      Trinidad Soto M.D.    4/10/2018    CC: Forest Grullon Jr., MD; MD Trinidad Douglas, *

## 2018-04-10 NOTE — PLAN OF CARE
Problem: Patient Care Overview  Goal: Plan of Care Review  Outcome: Ongoing (interventions implemented as appropriate)  Pt admitted to unit from PACU at this time. VSS. Dressing CDI. Numbness to fingers, able to wiggle a little. Cap refill WNL. Sling/swath in place. Family at bedside. Pt has no C/O pain. Ambulating to RR without difficulty. Demonstrated use of IS. No co-morbidities to discuss at this time. Possible D/C home in AM. Will monitor.    04/10/18 2760   Coping/Psychosocial   Plan of Care Reviewed With patient   Plan of Care Review   Progress no change     Goal: Individualization and Mutuality  Outcome: Ongoing (interventions implemented as appropriate)    Goal: Discharge Needs Assessment  Outcome: Ongoing (interventions implemented as appropriate)    Goal: Interprofessional Rounds/Family Conf  Outcome: Ongoing (interventions implemented as appropriate)      Problem: Shoulder Arthroplasty (Adult)  Goal: Signs and Symptoms of Listed Potential Problems Will be Absent, Minimized or Managed (Shoulder Arthroplasty)  Outcome: Ongoing (interventions implemented as appropriate)    Goal: Anesthesia/Sedation Recovery  Outcome: Ongoing (interventions implemented as appropriate)      Problem: Fall Risk (Adult)  Goal: Identify Related Risk Factors and Signs and Symptoms  Outcome: Ongoing (interventions implemented as appropriate)    Goal: Absence of Fall  Outcome: Ongoing (interventions implemented as appropriate)

## 2018-04-10 NOTE — ANESTHESIA PREPROCEDURE EVALUATION
Anesthesia Evaluation     Patient summary reviewed and Nursing notes reviewed   history of anesthetic complications (one time): PONV  NPO Solid Status: > 8 hours  NPO Liquid Status: > 2 hours           Airway   Mallampati: II  TM distance: >3 FB  Neck ROM: full  No difficulty expected  Dental      Pulmonary - normal exam   (+) a smoker Former,   Cardiovascular - normal exam  Exercise tolerance: good (4-7 METS)    (-) angina      Neuro/Psych  (+) headaches, psychiatric history Anxiety,     GI/Hepatic/Renal/Endo    (+)   renal disease stones,   (-) GERD    Musculoskeletal     Abdominal    Substance History   Drug use: clonazepam.     OB/GYN          Other   (+) arthritis (Left shoulder)                   Anesthesia Plan    ASA 2     general   (With block)  intravenous induction   Anesthetic plan and risks discussed with patient.

## 2018-04-10 NOTE — ANESTHESIA PROCEDURE NOTES
Peripheral Block    Patient location during procedure: holding area  Start time: 4/10/2018 6:43 AM  Stop time: 4/10/2018 6:47 AM  Reason for block: at surgeon's request and post-op pain management  Performed by  Anesthesiologist: VALERIA HANSON  Preanesthetic Checklist  Completed: patient identified, site marked, surgical consent, pre-op evaluation, timeout performed, IV checked, risks and benefits discussed and monitors and equipment checked  Prep:  Pt Position: sitting  Sterile barriers:cap, gloves, mask and sterile barriers  Prep: ChloraPrep  Patient monitoring: blood pressure monitoring, continuous pulse oximetry and EKG  Procedure  Sedation:yes  Performed under: MAC  Guidance:ultrasound guided  ULTRASOUND INTERPRETATION. Using ultrasound guidance a 20 G gauge needle was placed in close proximity to the nerve, at which point, under ultrasound guidance anesthetic was injected in the area of the nerve and spread of the anesthesia was seen on ultrasound in close proximity thereto.  There were no abnormalities seen on ultrasound; a digital image was taken; and the patient tolerated the procedure with no complications. Images:still images obtained    Laterality:left  Block Type:interscalene  Injection Technique:single-shot  Needle Type:echogenic  Needle Gauge:20 G  Resistance on Injection: none  Medications  Depomedrol:80  Local Injected:bupivacaine 0.5% with epinephrine Local Amount Injected:20mL  Post Assessment  Injection Assessment: negative aspiration for heme, no paresthesia on injection and incremental injection  Patient Tolerance:comfortable throughout block  Complications:no

## 2018-04-10 NOTE — ANESTHESIA POSTPROCEDURE EVALUATION
"Patient: Jemima Gaspar    Procedure Summary     Date:  04/10/18 Room / Location:  [facility] OR [address] [place] [address] MAIN OR    Anesthesia Start:  0723 Anesthesia Stop:  1102    Procedure:  LT TOTAL SHOULDER ARTHROPLASTY (Left Shoulder) Diagnosis:       Primary osteoarthritis, left shoulder      (Primary osteoarthritis, left shoulder)    Surgeon:  Trinidad Soto MD Provider:  Radha Cox MD    Anesthesia Type:  general ASA Status:  2          Anesthesia Type: general  Last vitals  BP   121/79 (04/10/18 1250)   Temp   36.7 °C (98.1 °F) (04/10/18 1250)   Pulse   83 (04/10/18 1250)   Resp   16 (04/10/18 1250)     SpO2   99 % (04/10/18 1250)     Post Anesthesia Care and Evaluation    Patient location during evaluation: bedside  Patient participation: complete - patient participated  Level of consciousness: awake  Pain score: 2  Pain management: adequate  Airway patency: patent  Anesthetic complications: No anesthetic complications  PONV Status: none  Cardiovascular status: acceptable  Respiratory status: acceptable  Hydration status: acceptable    Comments: /79 (BP Location: Right arm, Patient Position: Lying)   Pulse 83   Temp 36.7 °C (98.1 °F) (Oral)   Resp 16   Ht 165.1 cm (65\")   Wt 60.8 kg (134 lb)   SpO2 99%   BMI 22.30 kg/m²         "

## 2018-04-10 NOTE — CONSULTS
Name: Jemima Gaspar ADMIT: 4/10/2018   : 1960  PCP: Forest Grullon Jr., MD    MRN: [id number] LOS: 0 days   AGE/SEX: 57 y.o. female  ROOM: [facility]     Inpatient Hospitalist Consult  Consult performed by: STEPHON KELSEY  Consult ordered by: OZZY SHELDON  Reason for consult: Evaluate status of chronic medical problems that might contribue to postoperative care of TSA      Date of Admit: 4/10/2018  Date of Consult: 4/10/2018      Subjective   History of Present Illness  Ms. Gaspar is a 57 y.o. female that has been admitted to [facility] following elective LT TOTAL SHOULDER ARTHROPLASTY. She has been admitted to a orthopedic floor following surgery and we were asked to see and assist with her medical problems. At the time of my visit she denies any chest pain, SOA, nausea, vomiting or diarrhea. She does complain of expected postoperative discomfort. She has tolerated a diet. She has been in a normal state of health leading up to surgery.      Past Medical History:   Diagnosis Date   • Anxiety    • Arthritis    • Kidney stone     PASSED X 1   • Migraine    • Shoulder pain, left      Past Surgical History:   Procedure Laterality Date   • ENDOSCOPY      SCAR TISSUE   • HYSTERECTOMY     • SHOULDER SURGERY Left          Family History   Problem Relation Age of Onset   • Malig Hyperthermia Neg Hx      Social History   Substance Use Topics   • Smoking status: Former Smoker     Packs/day: 0.25     Years: 2.00     Types: Cigarettes     Quit date:    • Smokeless tobacco: Never Used      Comment: ONLY SMOKED A LITTLE FOR 2 YEARS   • Alcohol use No     Prescriptions Prior to Admission   Medication Sig Dispense Refill Last Dose   • cholecalciferol (VITAMIN D3) 1000 units tablet Take 1,000 Units by mouth Every Morning.   Past Month at Unknown time   • vitamin B-12 (CYANOCOBALAMIN) 100 MCG tablet Take 50 mcg by mouth Every Morning.   Past Month at Unknown time   • clonazePAM (KlonoPIN)  1 MG tablet Take 1 mg by mouth 2 (Two) Times a Day As Needed for Seizures.   4/9/2018 at 2300     Allergies:  Oxycodone    Review of Systems   Constitutional: Negative.    HENT: Negative.    Eyes: Negative.    Respiratory: Negative.    Cardiovascular: Negative.    Gastrointestinal: Negative.    Endocrine: Negative.    Genitourinary: Negative.    Musculoskeletal: Negative.    Skin: Negative.    Neurological: Negative.    Hematological: Negative.    Psychiatric/Behavioral: Negative.        Objective      Vital Signs  Temp:  [97.5 °F (36.4 °C)-98.1 °F (36.7 °C)] 98.1 °F (36.7 °C)  Heart Rate:  [65-88] 78  Resp:  [16-22] 16  BP: (107-128)/(68-94) 114/68  Body mass index is 22.3 kg/m².    Physical Exam   Constitutional: She is oriented to person, place, and time. She appears well-developed and well-nourished. No distress.   HENT:   Head: Normocephalic and atraumatic.   Eyes: Conjunctivae and EOM are normal. No scleral icterus.   Neck: Normal range of motion. Neck supple. No tracheal deviation present.   Cardiovascular: Normal rate and regular rhythm.    No murmur heard.  Pulmonary/Chest: Effort normal and breath sounds normal. No respiratory distress. She has no wheezes. She has no rales.   Abdominal: Soft. Bowel sounds are normal. She exhibits no distension and no mass. There is no tenderness.   Musculoskeletal: She exhibits no edema or deformity.   LUE in postop sling   Neurological: She is alert and oriented to person, place, and time. No cranial nerve deficit.   Skin: Skin is warm and dry. No rash noted. No erythema.   Psychiatric: She has a normal mood and affect. Her behavior is normal. Judgment and thought content normal.   Nursing note and vitals reviewed.      Results Review:    I reviewed the patient's new clinical results.      Assessment/Plan     Active Hospital Problems (** Indicates Principal Problem)    Diagnosis Date Noted   • **Primary osteoarthritis of left shoulder [M19.012] 03/31/2018   • Anxiety  [F41.9] 04/10/2018   • Postoperative state [Z98.890] 04/10/2018      Resolved Hospital Problems    Diagnosis Date Noted Date Resolved   No resolved problems to display.       Ms. Gaspar is a 57 y.o. female who is POD#0 LT TOTAL SHOULDER ARTHROPLASTY.    · She seems to be doing very well thus far postoperatively.  · Continue IVFs as ordered.    · Anxiety seems to be at baseline.  · SCDs have been ordered for DVT prophylaxis per ortho.  · She was encouraged to use incentive spirometer as instructed.      Thank you very much for asking [facility] to be involved in this patient's care. We will follow along with you. I would be okay with discharge tomorrow if cleared by orthopedics.      Trey Rich MD  [facility]  04/10/18  4:07 PM

## 2018-04-10 NOTE — ANESTHESIA PROCEDURE NOTES
Airway  Urgency: elective    Airway not difficult    General Information and Staff    Patient location during procedure: OR  Anesthesiologist: JAZMYNE LYNCH  CRNA: MAGNOLIA HERNANDEZ    Indications and Patient Condition  Indications for airway management: airway protection    Preoxygenated: yes  MILS maintained throughout  Mask difficulty assessment: 1 - vent by mask    Final Airway Details  Final airway type: endotracheal airway      Successful airway: ETT  Cuffed: yes   Successful intubation technique: direct laryngoscopy  Endotracheal tube insertion site: oral  Blade: Yamil  Blade size: #3  ETT size: 7.0 mm  Cormack-Lehane Classification: grade I - full view of glottis  Placement verified by: chest auscultation and capnometry   Cuff volume (mL): 6  Measured from: lips  ETT to lips (cm): 21  Number of attempts at approach: 1    Additional Comments  Atraumatic ET Tube placement.  Teeth as pre-op. BLEBS.  -ABD sounds.  +ET CO2.  Secured to face

## 2018-04-11 VITALS
SYSTOLIC BLOOD PRESSURE: 102 MMHG | RESPIRATION RATE: 20 BRPM | WEIGHT: 134 LBS | OXYGEN SATURATION: 97 % | BODY MASS INDEX: 22.33 KG/M2 | TEMPERATURE: 96.9 F | HEIGHT: 65 IN | HEART RATE: 74 BPM | DIASTOLIC BLOOD PRESSURE: 63 MMHG

## 2018-04-11 LAB
ANION GAP SERPL CALCULATED.3IONS-SCNC: 9.2 MMOL/L
BASOPHILS # BLD AUTO: 0 10*3/MM3 (ref 0–0.2)
BASOPHILS NFR BLD AUTO: 0 % (ref 0–1.5)
BUN BLD-MCNC: 9 MG/DL (ref 6–20)
BUN/CREAT SERPL: 14.1 (ref 7–25)
CALCIUM SPEC-SCNC: 8.2 MG/DL (ref 8.6–10.5)
CHLORIDE SERPL-SCNC: 104 MMOL/L (ref 98–107)
CO2 SERPL-SCNC: 25.8 MMOL/L (ref 22–29)
CREAT BLD-MCNC: 0.64 MG/DL (ref 0.57–1)
DEPRECATED RDW RBC AUTO: 44.3 FL (ref 37–54)
EOSINOPHIL # BLD AUTO: 0 10*3/MM3 (ref 0–0.7)
EOSINOPHIL NFR BLD AUTO: 0 % (ref 0.3–6.2)
ERYTHROCYTE [DISTWIDTH] IN BLOOD BY AUTOMATED COUNT: 13.1 % (ref 11.7–13)
GFR SERPL CREATININE-BSD FRML MDRD: 96 ML/MIN/1.73
GLUCOSE BLD-MCNC: 131 MG/DL (ref 65–99)
HCT VFR BLD AUTO: 28.2 % (ref 35.6–45.5)
HGB BLD-MCNC: 9.1 G/DL (ref 11.9–15.5)
IMM GRANULOCYTES # BLD: 0 10*3/MM3 (ref 0–0.03)
IMM GRANULOCYTES NFR BLD: 0 % (ref 0–0.5)
LYMPHOCYTES # BLD AUTO: 1.04 10*3/MM3 (ref 0.9–4.8)
LYMPHOCYTES NFR BLD AUTO: 13.7 % (ref 19.6–45.3)
MCH RBC QN AUTO: 29.7 PG (ref 26.9–32)
MCHC RBC AUTO-ENTMCNC: 32.3 G/DL (ref 32.4–36.3)
MCV RBC AUTO: 92.2 FL (ref 80.5–98.2)
MONOCYTES # BLD AUTO: 0.78 10*3/MM3 (ref 0.2–1.2)
MONOCYTES NFR BLD AUTO: 10.3 % (ref 5–12)
NEUTROPHILS # BLD AUTO: 5.78 10*3/MM3 (ref 1.9–8.1)
NEUTROPHILS NFR BLD AUTO: 76 % (ref 42.7–76)
PLATELET # BLD AUTO: 135 10*3/MM3 (ref 140–500)
PMV BLD AUTO: 9.9 FL (ref 6–12)
POTASSIUM BLD-SCNC: 4 MMOL/L (ref 3.5–5.2)
RBC # BLD AUTO: 3.06 10*6/MM3 (ref 3.9–5.2)
SODIUM BLD-SCNC: 139 MMOL/L (ref 136–145)
WBC NRBC COR # BLD: 7.6 10*3/MM3 (ref 4.5–10.7)

## 2018-04-11 PROCEDURE — G8979 MOBILITY GOAL STATUS: HCPCS | Performed by: PHYSICAL THERAPIST

## 2018-04-11 PROCEDURE — 25010000002 HYDROMORPHONE PER 4 MG: Performed by: ORTHOPAEDIC SURGERY

## 2018-04-11 PROCEDURE — 97110 THERAPEUTIC EXERCISES: CPT | Performed by: PHYSICAL THERAPIST

## 2018-04-11 PROCEDURE — 85025 COMPLETE CBC W/AUTO DIFF WBC: CPT | Performed by: ORTHOPAEDIC SURGERY

## 2018-04-11 PROCEDURE — 97161 PT EVAL LOW COMPLEX 20 MIN: CPT | Performed by: PHYSICAL THERAPIST

## 2018-04-11 PROCEDURE — G8980 MOBILITY D/C STATUS: HCPCS | Performed by: PHYSICAL THERAPIST

## 2018-04-11 PROCEDURE — 97165 OT EVAL LOW COMPLEX 30 MIN: CPT | Performed by: OCCUPATIONAL THERAPIST

## 2018-04-11 PROCEDURE — 80048 BASIC METABOLIC PNL TOTAL CA: CPT | Performed by: ORTHOPAEDIC SURGERY

## 2018-04-11 PROCEDURE — G8978 MOBILITY CURRENT STATUS: HCPCS | Performed by: PHYSICAL THERAPIST

## 2018-04-11 RX ADMIN — HYDROCODONE BITARTRATE AND ACETAMINOPHEN 2 TABLET: 7.5; 325 TABLET ORAL at 06:06

## 2018-04-11 RX ADMIN — HYDROMORPHONE HYDROCHLORIDE 0.5 MG: 1 INJECTION, SOLUTION INTRAMUSCULAR; INTRAVENOUS; SUBCUTANEOUS at 08:10

## 2018-04-11 RX ADMIN — HYDROCODONE BITARTRATE AND ACETAMINOPHEN 2 TABLET: 7.5; 325 TABLET ORAL at 10:12

## 2018-04-11 RX ADMIN — ASPIRIN 325 MG: 325 TABLET, DELAYED RELEASE ORAL at 08:10

## 2018-04-11 RX ADMIN — HYDROMORPHONE HYDROCHLORIDE 0.5 MG: 1 INJECTION, SOLUTION INTRAMUSCULAR; INTRAVENOUS; SUBCUTANEOUS at 03:30

## 2018-04-11 NOTE — DISCHARGE INSTRUCTIONS
Discharge and Follow up Instructions:     Total Shoulder Replacement Discharge Instructions:    I. ACTIVITIES:  1. Exercises:  ? Complete exercise program as taught by the hospital physical therapist 2 times per day  ? Wear sling when in bed and when out of bed (except when doing exercises)  ? Exercise program will be advanced by the physical therapist  ? During the day be up ambulating every 2 hours (while awake) for short distances  ? Complete the ankle pump exercises at least 10 times per hour (while awake)  ? Elevate operative arm when in bed and for at least 30 minutes during the day.   ? Use cold packs 20-30 minutes approximately 5 times per day. This should be done before and after completing your exercises and at any time you are experiencing pain/ stiffness in your operative extremity.    2. Activities of Daily Living:  ? No tub baths, hot tubs, or swimming pools for 4 weeks  ? May shower and let water run over the incision on post-operative day #5 if no drainage. Do not scrub or rub the incision. Simply let the water run over the incision and pat dry.    II. Restrictions  ? No pushing, pulling, lifting, or weight bearing on operative extremity.  ? Continue to follow shoulder precautions as instructed by hospital physical therapist  ? Your surgeon will discuss with you when you will be able to drive again. Usual guidelines are you are to be off pain medications prior to driving.  ? First week stay inside on even terrain. May go up and down stairs one stair at a time utilizing the hand rail once cleared by physical therapy to do so.  ? After one week, you may venture outside (if cleared to do so by physical therapist).    III. Precautions:  ? Everyone that comes near you should wash their hands  ? No elective dental, genital-urinary, or colon procedures or surgical procedures for 12 weeks after surgery unless absolutely necessary.  ?  If dental work or surgical procedure is deemed absolutely necessary, you  will need to contact your surgeon as you will need to take antibiotics 1 hour prior to any dental work (including teeth cleanings).  ? Please discuss with your surgeon prophylactic antibiotics as the length of time this intervention will be necessary for you varies with each patient’s health history and situation.  ? Avoid sick people. If you must be around someone who is ill, they should wear a mask.  ? Avoid visits to the Emergency Room or Urgent Care. If you feel you need to go to the Emergency Room, please notify your Surgeon's office.  ? Stockings are to be worn for one week after surgery and are to be placed on in the morning and removed at night. Observe your skin when stocking is removed for any problems. Monitor the stockings to ensure that any swelling is not causing the stockings to become too tight. In this case, remove stockings immediately.      IV. INCISION CARE:  ? Wash your hands prior to dressing changes  ? Change the dressing as needed to keep incision clean and dry. Utilize dry gauze and paper tape. Avoid touching the side of the gauze that goes against the incision with your hands.  ? No creams or ointments to the incision  ? May remove dressing once the incision is free of drainage  ? Do not touch or pick at the incision  ? Check incision every day and notify surgeon immediately if any of the following signs or symptoms are noted:  o Increase in redness  o Increase in swelling around the incision and of the entire extremity  o Increase in pain  o Drainage oozing from the incision  o Pulling apart of the edges of the incision  o Increase in overall body temperature (greater than 100.5 degrees)  ? Your Staples will be removed between 10-14 days postoperation.  This may be done by either the home health nurse, rehabilitation nurse or during your return visit to Dr. Soto's office.  You will then be instructed on how to care for the incision.  (Please call the office if your staples have not  been removed within 14 days after surgery).    V. Medications:     1. Stool Softeners: You will be at greater risk of constipation after surgery due to being less mobile and the pain medications.   ? Take stool softeners as instructed by your surgeon while on pain medications. Over the counter Colace 100 mg 1-2 capsules twice daily.   ? If stools become too loose or too frequent, please decreases the dosage or stop the stool softener.  ? If constipation occurs despite use of stool softeners, you are to continue the stool softeners and add a laxative (Milk of Magnesia 1 ounce daily as needed).  ? Dulcolax oral tabs or suppository, or a fleets enema can also be utilized for constipation and can be obtained over the counter.   ? If above interventions are unsuccessful in inducing bowel movements, please contact your surgeon's office / family physician's office.  ? Drink plenty of fluids, and eat fruits and vegetables during your recovery time    2. Pain Medications utilized after surgery are narcotics and the law requires that the following information be given to all patients that are prescribed narcotics:  ? CLASSIFICATION: Pain medications are called Opioids and are narcotics  ? LEGALITIES: It is illegal to share narcotics with others and to drive within 24 hours of taking narcotics  ? POTENTIAL SIDE EFFECTS: Potential side effects of opioids include: nausea, vomiting, itching, dizziness, drowsiness, dry mouth, constipation, and difficulty urinating.  ? POTENTIAL ADVERSE EFFECTS:   o Opioid tolerance can develop with use of pain medications and this simply means that it requires more and more of the medication to control pain; however, this is seen more in patients that use opioids for longer periods of time.  o Opioid dependence can develop with use of Opioids and this simply means that to stop the medication can cause withdrawal symptoms; however, this is seen with patients that use Opioids for longer periods of  time.  o Opioid addiction can develop with use of Opioids and the incidence of this is very unlikely in patients who take the medications as ordered and stop the medications as instructed.  o Opioid overdose can be dangerous, but is unlikely when the medication is taken as ordered and stopped when ordered. It is important not to mix opioids with alcohol or with and type of sedative such as Benadryl as this can lead to over sedation and respiratory difficulty.  ? DOSAGE:   o Pain medications will need to be taken consistently for the first week to decrease pain and promote adequate pain relief and participation in physical therapy.  o After the initial surgical pain begins to resolve, you may begin to decrease the pain medication. By the end of 6 weeks, you should be off of pain medications.  o Refills will not be given by the office during evening hours, on weekends, or after 6 weeks post-op.  o To seek refills on pain medications during the initial 6 week post-operative period, you must call the office 48 hours in advance to request the refill. The office will then notify you when to  the prescription. DO NOT wait until you are out of the medication to request a refill.    V. FOLLOW-UP VISITS:  ? You will need to follow up in the office with your surgeon in 5/2/18. Please call this number [phone]  to schedule this appointment.  If you have any concerns or suspected complications prior to your follow up visit, please call your surgeons office. Do not wait until your appointment time if you suspect complications. These will need to be addressed in the office promptly.

## 2018-04-11 NOTE — PLAN OF CARE
Problem: Patient Care Overview  Goal: Plan of Care Review  Outcome: Ongoing (interventions implemented as appropriate)   04/11/18 1203   Coping/Psychosocial   Plan of Care Reviewed With patient   Plan of Care Review   Progress improving   OTHER   Outcome Summary Postop LTSA. Pain tolerable with PO analgesic. Abduction sling in place. No numbness/tingling. Pt anxiety much improved from this morning. DC home with assist.     Goal: Individualization and Mutuality  Outcome: Ongoing (interventions implemented as appropriate)    Goal: Discharge Needs Assessment  Outcome: Ongoing (interventions implemented as appropriate)    Goal: Interprofessional Rounds/Family Conf  Outcome: Ongoing (interventions implemented as appropriate)      Problem: Shoulder Arthroplasty (Adult)  Goal: Signs and Symptoms of Listed Potential Problems Will be Absent, Minimized or Managed (Shoulder Arthroplasty)  Outcome: Ongoing (interventions implemented as appropriate)   04/11/18 1203   Goal/Outcome Evaluation   Problems Assessed (Shoulder Arthro) all   Problems Present (Shoulder Arthro) pain;situational response     Goal: Anesthesia/Sedation Recovery  Outcome: Ongoing (interventions implemented as appropriate)   04/11/18 1203   Goal/Outcome Evaluation   Anesthesia/Sedation Recovery criteria met for discharge       Problem: Fall Risk (Adult)  Goal: Absence of Fall  Outcome: Ongoing (interventions implemented as appropriate)   04/11/18 1203   Fall Risk (Adult)   Absence of Fall achieves outcome

## 2018-04-11 NOTE — PROGRESS NOTES
Orthopedic Progress Note      Patient: Jemima Gaspar  YOB: 1960     Date of Admission: 4/10/2018  5:37 AM Medical Record Number: [id number]     Attending Physician: Trinidad Soto, *    Status Post:  LT TOTAL SHOULDER ARTHROPLASTY Post Operative Day Number: 1    Subjective : No new orthopaedic complaints     Pain Relief: some relief with present medication.     Systemic Complaints: No Complaints  Vitals:    04/10/18 1900 04/10/18 2334 04/11/18 0300 04/11/18 0711   BP: 99/62 99/63 112/72 119/74   BP Location: Right arm Right arm Right arm Right arm   Patient Position: Lying Lying Lying Lying   Pulse: 88 81 74 83   Resp: 16 16 16 18   Temp: 98.6 °F (37 °C) 98.2 °F (36.8 °C) 97.2 °F (36.2 °C) 97.1 °F (36.2 °C)   TempSrc:  Oral  Oral   SpO2: 100% 99% 100% 98%   Weight:       Height:           Physical Exam: 57 y.o. female    General Appearance:       Alert, cooperative, in no acute distress                  Extremities:    Dressing Clean, Dry and Intact         Incision healthy without signs or symptoms of infections         No clinical sign of DVT        Able to do good movements of digits    Pulses:   Pulses palpable and equal bilaterally           Diagnostic Tests:       Results from last 7 days  Lab Units 04/11/18  0304   WBC 10*3/mm3 7.60   HEMOGLOBIN g/dL 9.1*   HEMATOCRIT % 28.2*   PLATELETS 10*3/mm3 135*       Results from last 7 days  Lab Units 04/11/18  0304   SODIUM mmol/L 139   POTASSIUM mmol/L 4.0   CHLORIDE mmol/L 104   CO2 mmol/L 25.8   BUN mg/dL 9   CREATININE mg/dL 0.64   GLUCOSE mg/dL 131*   CALCIUM mg/dL 8.2*         No results found for: URICACID  No results found for: CRYSTAL  Microbiology Results (last 10 days)     ** No results found for the last 240 hours. **        No radiology results for the last 7 days      Current Medications:  Scheduled Meds:  aspirin 325 mg Oral Daily     Continuous Infusions:  lactated ringers 100 mL/hr Last Rate: Stopped (04/10/18 1607)     PRN  Meds:.•  acetaminophen  •  bisacodyl  •  clonazePAM  •  diphenhydrAMINE **OR** diphenhydrAMINE  •  docusate sodium  •  HYDROcodone-acetaminophen  •  HYDROcodone-acetaminophen  •  HYDROmorphone **AND** naloxone  •  melatonin  •  ondansetron **OR** ondansetron ODT **OR** ondansetron    Assessment: Status post  LT TOTAL SHOULDER ARTHROPLASTY    Patient Active Problem List   Diagnosis   • Primary osteoarthritis of left shoulder   • Anxiety   • Postoperative state       PLAN:   Continues current post-op course  Anticoagulation: Aspirin started  Mobilize with PT as tolerated per protocol    Weight Bearing: NWB  Discharge Plan: OK to plan for discharge in  today to home and home health  from orthopadic perspective.    Trinidad Soto MD    Date: 4/11/2018    Time: 7:58 AM

## 2018-04-11 NOTE — PLAN OF CARE
Problem: Patient Care Overview  Goal: Plan of Care Review   04/11/18 1220   Coping/Psychosocial   Plan of Care Reviewed With patient   Plan of Care Review   Progress no change   OTHER   Outcome Summary pt evaluated for OT. pt ed on NWB status, ed on UBD and bathing technique. pt ed on UE ex, which she states PT left her a home program. ed pt on safety at home w. ADLs. and pt to receive outpatient or HH for L UE shoulder sx

## 2018-04-11 NOTE — PLAN OF CARE
Problem: Patient Care Overview  Goal: Plan of Care Review  Outcome: Ongoing (interventions implemented as appropriate)   04/11/18 0600   Coping/Psychosocial   Plan of Care Reviewed With patient   Plan of Care Review   Progress improving   OTHER   Outcome Summary Pt has been stable through the night. Increased pain, dilaudid IV given x1. Ambulating with stand by assistance. Sling in place with effects of block worn off. Baseline hypotensive, not symptomatic.      Goal: Individualization and Mutuality  Outcome: Ongoing (interventions implemented as appropriate)    Goal: Discharge Needs Assessment  Outcome: Ongoing (interventions implemented as appropriate)   04/11/18 0600   Discharge Needs Assessment   Readmission Within the Last 30 Days no previous admission in last 30 days   Concerns to be Addressed basic needs   Patient/Family Anticipates Transition to home with family   Patient/Family Anticipated Services at Transition none   Transportation Concerns car, none   Transportation Anticipated family or friend will provide     Goal: Interprofessional Rounds/Family Conf  Outcome: Ongoing (interventions implemented as appropriate)   04/11/18 0600   Interdisciplinary Rounds/Family Conf   Participants nursing;patient       Problem: Shoulder Arthroplasty (Adult)  Goal: Signs and Symptoms of Listed Potential Problems Will be Absent, Minimized or Managed (Shoulder Arthroplasty)  Outcome: Ongoing (interventions implemented as appropriate)   04/11/18 0600   Goal/Outcome Evaluation   Problems Assessed (Shoulder Arthro) pain;functional deficit   Problems Present (Shoulder Arthro) pain     Goal: Anesthesia/Sedation Recovery  Outcome: Ongoing (interventions implemented as appropriate)   04/11/18 0600   Goal/Outcome Evaluation   Anesthesia/Sedation Recovery progressing toward baseline       Problem: Fall Risk (Adult)  Goal: Identify Related Risk Factors and Signs and Symptoms  Outcome: Outcome(s) achieved Date Met: 04/11/18    04/11/18 0600   Fall Risk (Adult)   Related Risk Factors (Fall Risk) culprit medication(s);environment unfamiliar   Signs and Symptoms (Fall Risk) presence of risk factors     Goal: Absence of Fall  Outcome: Ongoing (interventions implemented as appropriate)   04/11/18 0600   Fall Risk (Adult)   Absence of Fall achieves outcome

## 2018-04-11 NOTE — DISCHARGE SUMMARY
Orthopedic Discharge Summary      Patient: Jemima Gaspar   YOB: 1960    Medical Record Number: [id number]    Attending Physician: Trinidad Soto, *  Consulting Physician(s):   Date of Admission: 4/10/2018  5:37 AM  Date of Discharge:     LT TOTAL SHOULDER ARTHROPLASTY    Patient Active Problem List   Diagnosis   • Primary osteoarthritis of left shoulder   • Anxiety   • Postoperative state       Allergies   Allergen Reactions   • Oxycodone Itching        Jemima Gaspar   Home Medication Instructions SHIVA:[id number]    Printed on:04/11/18 8787   Medication Information                      aspirin  MG EC tablet  Take 1 tablet by mouth Daily for 21 doses.             cholecalciferol (VITAMIN D3) 1000 units tablet  Take 1,000 Units by mouth Every Morning.             clonazePAM (KlonoPIN) 1 MG tablet  Take 1 mg by mouth 2 (Two) Times a Day As Needed for Seizures.             HYDROcodone-acetaminophen (NORCO) 7.5-325 MG per tablet  Take 1 tablet by mouth Every 4 (Four) Hours As Needed for Moderate Pain  for up to 10 days.             vitamin B-12 (CYANOCOBALAMIN) 100 MCG tablet  Take 50 mcg by mouth Every Morning.                    Past Medical History:   Diagnosis Date   • Anxiety    • Arthritis    • Kidney stone     PASSED X 1   • Migraine    • Shoulder pain, left         Past Surgical History:   Procedure Laterality Date   • ENDOSCOPY      SCAR TISSUE   • HYSTERECTOMY     • SHOULDER SURGERY Left     2015        Social History     Occupational History   • Not on file.     Social History Main Topics   • Smoking status: Former Smoker     Packs/day: 0.25     Years: 2.00     Types: Cigarettes     Quit date: 2018   • Smokeless tobacco: Never Used      Comment: ONLY SMOKED A LITTLE FOR 2 YEARS   • Alcohol use No   • Drug use: No   • Sexual activity: Not on file      Social History     Social History Narrative   • No narrative on file        Family History   Problem Relation Age of Onset   •  Malig Hyperthermia Neg Hx        Physical Exam: 57 y.o. female  General Appearance:    Alert, cooperative, in no acute distress                      Vitals:    04/10/18 1900 04/10/18 2334 04/11/18 0300 04/11/18 0711   BP: 99/62 99/63 112/72 119/74   BP Location: Right arm Right arm Right arm Right arm   Patient Position: Lying Lying Lying Lying   Pulse: 88 81 74 83   Resp: 16 16 16 18   Temp: 98.6 °F (37 °C) 98.2 °F (36.8 °C) 97.2 °F (36.2 °C) 97.1 °F (36.2 °C)   TempSrc:  Oral  Oral   SpO2: 100% 99% 100% 98%   Weight:       Height:            Hospital Course:  57 y.o. female admitted to [facility] to services of Satya Saenz with left shoulder arthritis on 4/10/2018 and underwent a total shoulder replacement per Trinidad Soto MD. Antibiotic Kefzol 2 gm every 8 hours  and VTE prophylaxis including sequential compression devices   were per SCIP protocols. Post-operatively the patient transferred to the post-operative floor where the patient underwent mobilization therapy that included active ROM exercises. Opioids were titrated to achieve appropriate pain management to allow for participation in mobilization exercises. Vital signs are now stable. The incision is intact without signs or symptoms of infection. Operative extremity neurovascular status remains intact.   Appropriate education re: incision care, activity levels, medications, dislocation precautions, and follow up visits was completed and all questions were answered. The patient is now deemed stable for discharge.      DIAGNOSTIC TESTS:     Admission on 04/10/2018   Component Date Value Ref Range Status   • Glucose 04/11/2018 131* 65 - 99 mg/dL Final   • BUN 04/11/2018 9  6 - 20 mg/dL Final   • Creatinine 04/11/2018 0.64  0.57 - 1.00 mg/dL Final   • Sodium 04/11/2018 139  136 - 145 mmol/L Final   • Potassium 04/11/2018 4.0  3.5 - 5.2 mmol/L Final   • Chloride 04/11/2018 104  98 - 107 mmol/L Final   • CO2 04/11/2018 25.8  22.0 -  29.0 mmol/L Final   • Calcium 04/11/2018 8.2* 8.6 - 10.5 mg/dL Final   • eGFR Non African Amer 04/11/2018 96  >60 mL/min/1.73 Final   • BUN/Creatinine Ratio 04/11/2018 14.1  7.0 - 25.0 Final   • Anion Gap 04/11/2018 9.2  mmol/L Final   • WBC 04/11/2018 7.60  4.50 - 10.70 10*3/mm3 Final   • RBC 04/11/2018 3.06* 3.90 - 5.20 10*6/mm3 Final   • Hemoglobin 04/11/2018 9.1* 11.9 - 15.5 g/dL Final   • Hematocrit 04/11/2018 28.2* 35.6 - 45.5 % Final   • MCV 04/11/2018 92.2  80.5 - 98.2 fL Final   • MCH 04/11/2018 29.7  26.9 - 32.0 pg Final   • MCHC 04/11/2018 32.3* 32.4 - 36.3 g/dL Final   • RDW 04/11/2018 13.1* 11.7 - 13.0 % Final   • RDW-SD 04/11/2018 44.3  37.0 - 54.0 fl Final   • MPV 04/11/2018 9.9  6.0 - 12.0 fL Final   • Platelets 04/11/2018 135* 140 - 500 10*3/mm3 Final   • Neutrophil % 04/11/2018 76.0  42.7 - 76.0 % Final   • Lymphocyte % 04/11/2018 13.7* 19.6 - 45.3 % Final   • Monocyte % 04/11/2018 10.3  5.0 - 12.0 % Final   • Eosinophil % 04/11/2018 0.0* 0.3 - 6.2 % Final   • Basophil % 04/11/2018 0.0  0.0 - 1.5 % Final   • Immature Grans % 04/11/2018 0.0  0.0 - 0.5 % Final   • Neutrophils, Absolute 04/11/2018 5.78  1.90 - 8.10 10*3/mm3 Final   • Lymphocytes, Absolute 04/11/2018 1.04  0.90 - 4.80 10*3/mm3 Final   • Monocytes, Absolute 04/11/2018 0.78  0.20 - 1.20 10*3/mm3 Final   • Eosinophils, Absolute 04/11/2018 0.00  0.00 - 0.70 10*3/mm3 Final   • Basophils, Absolute 04/11/2018 0.00  0.00 - 0.20 10*3/mm3 Final   • Immature Grans, Absolute 04/11/2018 0.00  0.00 - 0.03 10*3/mm3 Final     No results found for: URICACID  No results found for: CRYSTAL  Microbiology Results (last 10 days)     ** No results found for the last 240 hours. **        No radiology results for the last 7 days      Discharge and Follow up Instructions:     I. ACTIVITIES:  1. Exercises:  ? Complete exercise program as taught by the hospital physical therapist 2 times per day  ? Wear sling when in bed and when out of bed (except when doing  exercises)  ? Exercise program will be advanced by the physical therapist  ? During the day be up ambulating every 2 hours (while awake) for short distances  ? Complete the ankle pump exercises at least 10 times per hour (while awake)  ? Elevate operative arm when in bed and for at least 30 minutes during the day.   ? Use cold packs 20-30 minutes approximately 5 times per day. This should be done before and after completing your exercises and at any time you are experiencing pain/ stiffness in your operative extremity.    2. Activities of Daily Living:  ? No tub baths, hot tubs, or swimming pools for 4 weeks  ? May shower and let water run over the incision on post-operative day #5 if no drainage. Do not scrub or rub the incision. Simply let the water run over the incision and pat dry.    II. Restrictions  ? No pushing, pulling, lifting, or weight bearing on operative extremity.  ? Continue to follow shoulder precautions as instructed by hospital physical therapist  ? Your surgeon will discuss with you when you will be able to drive again. Usual guidelines are you are to be off pain medications prior to driving.  ? First week stay inside on even terrain. May go up and down stairs one stair at a time utilizing the hand rail once cleared by physical therapy to do so.  ? After one week, you may venture outside (if cleared to do so by physical therapist).    III. Precautions:  ? Everyone that comes near you should wash their hands  ? No elective dental, genital-urinary, or colon procedures or surgical procedures for 12 weeks after surgery unless absolutely necessary.  ?  If dental work or surgical procedure is deemed absolutely necessary, you will need to contact your surgeon as you will need to take antibiotics 1 hour prior to any dental work (including teeth cleanings).  ? Please discuss with your surgeon prophylactic antibiotics as the length of time this intervention will be necessary for you varies with each  patient’s health history and situation.  ? Avoid sick people. If you must be around someone who is ill, they should wear a mask.  ? Avoid visits to the Emergency Room or Urgent Care. If you feel you need to go to the Emergency Room, please notify your Surgeon's office.  ? Stockings are to be worn for one week after surgery and are to be placed on in the morning and removed at night. Observe your skin when stocking is removed for any problems. Monitor the stockings to ensure that any swelling is not causing the stockings to become too tight. In this case, remove stockings immediately.      IV. INCISION CARE:  ? Wash your hands prior to dressing changes  ? Change the dressing as needed to keep incision clean and dry. Utilize dry gauze and paper tape. Avoid touching the side of the gauze that goes against the incision with your hands.  ? No creams or ointments to the incision  ? May remove dressing once the incision is free of drainage  ? Do not touch or pick at the incision  ? Check incision every day and notify surgeon immediately if any of the following signs or symptoms are noted:  o Increase in redness  o Increase in swelling around the incision and of the entire extremity  o Increase in pain  o Drainage oozing from the incision  o Pulling apart of the edges of the incision  o Increase in overall body temperature (greater than 100.5 degrees)  ? Your Staples will be removed between 10-14 days postoperation.  This may be done by either the home health nurse, rehabilitation nurse or during your return visit to Dr. Soto's office.  You will then be instructed on how to care for the incision.  (Please call the office if your staples have not been removed within 14 days after surgery).    V. Medications:     1. Stool Softeners: You will be at greater risk of constipation after surgery due to being less mobile and the pain medications.   ? Take stool softeners as instructed by your surgeon while on pain medications.  Over the counter Colace 100 mg 1-2 capsules twice daily.   ? If stools become too loose or too frequent, please decreases the dosage or stop the stool softener.  ? If constipation occurs despite use of stool softeners, you are to continue the stool softeners and add a laxative (Milk of Magnesia 1 ounce daily as needed).  ? Dulcolax oral tabs or suppository, or a fleets enema can also be utilized for constipation and can be obtained over the counter.   ? If above interventions are unsuccessful in inducing bowel movements, please contact your surgeon's office / family physician's office.  ? Drink plenty of fluids, and eat fruits and vegetables during your recovery time    2. Pain Medications utilized after surgery are narcotics and the law requires that the following information be given to all patients that are prescribed narcotics:  ? CLASSIFICATION: Pain medications are called Opioids and are narcotics  ? LEGALITIES: It is illegal to share narcotics with others and to drive within 24 hours of taking narcotics  ? POTENTIAL SIDE EFFECTS: Potential side effects of opioids include: nausea, vomiting, itching, dizziness, drowsiness, dry mouth, constipation, and difficulty urinating.  ? POTENTIAL ADVERSE EFFECTS:   o Opioid tolerance can develop with use of pain medications and this simply means that it requires more and more of the medication to control pain; however, this is seen more in patients that use opioids for longer periods of time.  o Opioid dependence can develop with use of Opioids and this simply means that to stop the medication can cause withdrawal symptoms; however, this is seen with patients that use Opioids for longer periods of time.  o Opioid addiction can develop with use of Opioids and the incidence of this is very unlikely in patients who take the medications as ordered and stop the medications as instructed.  o Opioid overdose can be dangerous, but is unlikely when the medication is taken as  ordered and stopped when ordered. It is important not to mix opioids with alcohol or with and type of sedative such as Benadryl as this can lead to over sedation and respiratory difficulty.  ? DOSAGE:   o Pain medications will need to be taken consistently for the first week to decrease pain and promote adequate pain relief and participation in physical therapy.  o After the initial surgical pain begins to resolve, you may begin to decrease the pain medication. By the end of 6 weeks, you should be off of pain medications.  o Refills will not be given by the office during evening hours, on weekends, or after 6 weeks post-op.  o To seek refills on pain medications during the initial 6 week post-operative period, you must call the office 48 hours in advance to request the refill. The office will then notify you when to  the prescription. DO NOT wait until you are out of the medication to request a refill.    V. FOLLOW-UP VISITS:  ? You will need to follow up in the office with your surgeon in 5/2/18. Please call this number [phone]  to schedule this appointment.  If you have any concerns or suspected complications prior to your follow up visit, please call your surgeons office. Do not wait until your appointment time if you suspect complications. These will need to be addressed in the office promptly.    Trinidad Soto MD      CC: Forest Grullon Jr., MD; MD Trinidad Douglas, *

## 2018-04-11 NOTE — PLAN OF CARE
Problem: Patient Care Overview  Goal: Plan of Care Review   04/11/18 0908   OTHER   Outcome Summary PT is s/p L TSA. Pt was educated on HEP, post-op precautions and use of sling. Pt verbalized understanding and demonstrated HEP independently. Pt is safe to d/c home when medically stable. PT will sign off.

## 2018-04-11 NOTE — THERAPY DISCHARGE NOTE
Acute Care - Occupational Therapy Initial Eval/Discharge  [facility]     Patient Name: Jemima Gaspar  : 1960  MRN: [id number]  Today's Date: 2018               Admit Date: 4/10/2018       ICD-10-CM ICD-9-CM   1. Primary osteoarthritis of left shoulder M19.012 715.11     Patient Active Problem List   Diagnosis   • Primary osteoarthritis of left shoulder   • Anxiety   • Postoperative state     Past Medical History:   Diagnosis Date   • Anxiety    • Arthritis    • Kidney stone     PASSED X 1   • Migraine    • Shoulder pain, left      Past Surgical History:   Procedure Laterality Date   • ENDOSCOPY      SCAR TISSUE   • HYSTERECTOMY     • SHOULDER SURGERY Left     2015          OT ASSESSMENT FLOWSHEET (last 72 hours)      Occupational Therapy Evaluation     Row Name 18 1211                   OT Evaluation Time/Intention    Subjective Information complains of   upset about her son and her situation  -        Document Type evaluation;discharge evaluation/summary  -        Mode of Treatment individual therapy;occupational therapy  -           General Information    Patient Profile Reviewed? yes  -        Patient Observations cooperative;alert  -        Prior Level of Function independent:;ADL's;transfer  -        Equipment Currently Used at Home none  -        Existing Precautions/Restrictions weight bearing   NWB L UE  -        Benefits Reviewed patient:;improve function;increase independence;increase strength;increase balance  -           Relationship/Environment    Lives With alone   going to a friends house  -           Cognitive Assessment/Intervention- PT/OT    Orientation Status (Cognition) oriented x 4  -KP        Follows Commands (Cognition) follows two step commands;over 90% accuracy  -        Personal Safety Interventions fall prevention program maintained  -           Bed Mobility Assessment/Treatment    Comment (Bed Mobility) pt in bed. just up recently to   -            General ROM    GENERAL ROM COMMENTS R UE 8/8 L UE sling on, just did pendulum and shoulder protocol exercises  -KP           General Assessment (Manual Muscle Testing)    Comment, General Manual Muscle Testing (MMT) Assessment R UE 4+/5 L UE NT  -KP           Positioning and Restraints    Pre-Treatment Position in bed  -KP        Post Treatment Position bed  -KP        In Bed supine;call light within reach;encouraged to call for assist;exit alarm on;with other staff  -KP           Wound 04/10/18 1016 Left arm incision    Wound - Properties Group Date first assessed: 04/10/18  -CS Time first assessed: 1016  -CS Side: Left  -CS Location: arm  -CS Type: incision  -CS       Plan of Care Review    Plan of Care Reviewed With patient  -KP           Clinical Impression (OT)    Criteria for Skilled Therapeutic Interventions Met (OT Eval) no problems identified which require skilled intervention   pt ed on prec. UBD tech. and has friend to A at home. ed ex  -KP        Anticipated Discharge Disposition (OT) home with home health   or outpatient therapy for shoulder  -KP           Patient Education Goal (OT)    Activity (Patient Education Goal, OT) pt ed on UBD and bathing technique, ed on pendulum  ex, NWB status.   -KP        [place]/Cues/Accuracy (Memory Goal 2, OT) demonstrates adequately;verbalizes understanding  -KP        Time Frame (Patient Education Goal, OT) short term goal (STG);1 day  -KP        Progress/Outcome (Patient Education Goal, OT) goal met  -KP           Discharge Summary (Occupational Therapy)    Additional Documentation Discharge Summary, OT Eval (Group)  -KP           Discharge Summary, OT Eval    Reason for Discharge (OT Discharge Summary) patient discharged from this facility;patient met all goals and outcomes  -KP        Outcomes Achieved Upon Discharge (OT Discharge Summary) able to achieve all goals within established timeline  -KP        Transfer to Another Level of Care or Facility  (OT Discharge Summary) patient to receive therapy via outpatient clinic;patient to receive therapy via home health  -          User Key  (r) = Recorded By, (t) = Taken By, (c) = Cosigned By    Initials Name Effective Dates     Barbara Case, OTR 04/13/15 -     CS Elida Myrick RN 06/16/16 -           Occupational Therapy Education     Title: PT OT SLP Therapies (Resolved)     Topic: Occupational Therapy (Resolved)     Point: ADL training (Resolved)     Description: Instruct learner(s) on proper safety adaptation and remediation techniques during self care or transfers.   Instruct in proper use of assistive devices.   Learning Progress Summary     Learner Status Readiness Method Response Comment Documented by    Patient Done Acceptance E,LYNDSEY,JUAN DIEGO MEEKS pt ed on OT role, ed on safety prec. w. L UE. pt ed on NWB status, ed on exercises, and ed on UBD bathing techniques. pt states her friend can A her with those needs if she is unable to do them on her own. pt has no concerns, recommend outpatient [facility] 04/11/18 1218          Point: Home exercise program (Resolved)     Description: Instruct learner(s) on appropriate technique for monitoring, assisting and/or progressing therapeutic exercises/activities.   Learning Progress Summary     Learner Status Readiness Method Response Comment Documented by    Patient Done Acceptance E,TBJUAN DIEGO pt ed on OT role, ed on safety prec. w. L UE. pt ed on NWB status, ed on exercises, and ed on UBD bathing techniques. pt states her friend can A her with those needs if she is unable to do them on her own. pt has no concerns, recommend outpatient [facility] 04/11/18 1218          Point: Precautions (Resolved)     Description: Instruct learner(s) on prescribed precautions during self-care and functional transfers.   Learning Progress Summary     Learner Status Readiness Method Response Comment Documented by    Patient Done Acceptance E,TB,JUAN DIEGO MEEKS pt ed on OT role, ed on safety prec. w. L UE.  pt ed on NWB status, ed on exercises, and ed on UBD bathing techniques. pt states her friend can A her with those needs if she is unable to do them on her own. pt has no concerns, recommend outpatient [facility] 04/11/18 1218          Point: Body mechanics (Resolved)     Description: Instruct learner(s) on proper positioning and spine alignment during self-care, functional mobility activities and/or exercises.   Learning Progress Summary     Learner Status Readiness Method Response Comment Documented by    Patient Done Acceptance E,TB,JUAN DIEGO MEEKS pt ed on OT role, ed on safety prec. w. L UE. pt ed on NWB status, ed on exercises, and ed on UBD bathing techniques. pt states her friend can A her with those needs if she is unable to do them on her own. pt has no concerns, recommend outpatient [facility] 04/11/18 1218                      User Key     Initials Effective Dates Name Provider Type Discipline     04/13/15 -  Barbara Case, OTR Occupational Therapist OT                OT Recommendation and Plan  Outcome Summary/Treatment Plan (OT)  Anticipated Discharge Disposition (OT): home with home health (or outpatient therapy for shoulder)  Reason for Discharge (OT Discharge Summary): patient discharged from this facility, patient met all goals and outcomes  Plan of Care Review  Plan of Care Reviewed With: patient  Plan of Care Reviewed With: patient  Outcome Summary: pt evaluated for OT. pt ed on NWB status, ed on UBD and bathing technique. pt ed on UE ex, which she states PT left her a home program. ed pt on safety at home w. ADLs. and pt to receive outpatient or  for L UE shoulder sx           OT Rehab Goals     Row Name 04/11/18 1211             Patient Education Goal (OT)    Activity (Patient Education Goal, OT) pt ed on UBD and bathing technique, ed on pendulum  ex, NWB status.   -KP      [place]/Cues/Accuracy (Memory Goal 2, OT) demonstrates adequately;verbalizes understanding  -KP      Time Frame (Patient  Education Goal, OT) short term goal (STG);1 day  -      Progress/Outcome (Patient Education Goal, OT) goal met  -        User Key  (r) = Recorded By, (t) = Taken By, (c) = Cosigned By    Initials Name Provider Type Discipline    VINCENT Case, OTR Occupational Therapist OT                Outcome Measures     Row Name 04/11/18 1222 04/11/18 0900          How much help from another person do you currently need...    Turning from your back to your side while in flat bed without using bedrails?  -- 4  -KH     Moving from lying on back to sitting on the side of a flat bed without bedrails?  -- 4  -KH     Moving to and from a bed to a chair (including a wheelchair)?  -- 4  -KH     Standing up from a chair using your arms (e.g., wheelchair, bedside chair)?  -- 4  -KH     Climbing 3-5 steps with a railing?  -- 3  -KH     To walk in hospital room?  -- 4  -KH     AM-PAC 6 Clicks Score  -- 23  -KH        How much help from another is currently needed...    Putting on and taking off regular lower body clothing? 3  -KP  --     Bathing (including washing, rinsing, and drying) 3  -KP  --     Toileting (which includes using toilet bed pan or urinal) 3  -KP  --     Putting on and taking off regular upper body clothing 3  -KP  --     Taking care of personal grooming (such as brushing teeth) 4  -KP  --     Eating meals 4  -  --     Score 20  -KP  --        Functional Assessment    Outcome Measure Options AM-PAC 6 Clicks Daily Activity (OT)  - AM-PAC 6 Clicks Basic Mobility (PT)  -       User Key  (r) = Recorded By, (t) = Taken By, (c) = Cosigned By    Initials Name Provider Type    CLIFTON Maciel, PT Physical Therapist    VINCENT Case, OTR Occupational Therapist          Time Calculation:         Time Calculation- OT     Row Name 04/11/18 1222             Time Calculation- OT    OT Start Time 1052  -      OT Stop Time 1120  -      OT Time Calculation (min) 28 min  -      OT Received On  04/11/18  -        User Key  (r) = Recorded By, (t) = Taken By, (c) = Cosigned By    Initials Name Provider Type     RYAN Moscoso Occupational Therapist          Therapy Charges for Today     Code Description Service Date Service Provider Modifiers Qty    34208818720 HC OT EVAL LOW COMPLEXITY 2 4/11/2018 RYAN Moscoso GO 1               OT Discharge Summary  Anticipated Discharge Disposition (OT): home with home health (or outpatient therapy for shoulder)    RYAN Moscoso  4/11/2018

## 2018-04-11 NOTE — THERAPY DISCHARGE NOTE
Acute Care - Physical Therapy Initial Eval/Discharge  [facility]     Patient Name: Jemima Gaspar  : 1960  MRN: [id number]  Today's Date: 2018                Admit Date: 4/10/2018    Visit Dx:    ICD-10-CM ICD-9-CM   1. Primary osteoarthritis of left shoulder M19.012 715.11     Patient Active Problem List   Diagnosis   • Primary osteoarthritis of left shoulder   • Anxiety   • Postoperative state     Past Medical History:   Diagnosis Date   • Anxiety    • Arthritis    • Kidney stone     PASSED X 1   • Migraine    • Shoulder pain, left      Past Surgical History:   Procedure Laterality Date   • ENDOSCOPY      SCAR TISSUE   • HYSTERECTOMY     • SHOULDER SURGERY Left               PT ASSESSMENT (last 12 hours)      Physical Therapy Evaluation     Row Name 18 0904          PT Evaluation Time/Intention    Subjective Information no complaints  -     Document Type evaluation  -     Mode of Treatment physical therapy  -     Patient Effort good  -     Symptoms Noted During/After Treatment none  -     Row Name 18 0904          General Information    Patient Observations cooperative;alert;agree to therapy  -     General Observations of Patient in bed, SLing LUE, positioned correctly  -     Prior Level of Function independent:  -KH     Equipment Currently Used at Home none  -     Pertinent History of Current Functional Problem s/p L TSA  -     Existing Precautions/Restrictions brace worn when out of bed;shoulder;fall  -     Row Name 18 0904          Resource/Environmental Concerns    Current Living Arrangements home/apartment/condo  -     Row Name 18 0904          Cognitive Assessment/Interventions    Additional Documentation Cognitive Assessment/Intervention (Group)  -     Row Name 18 0904          Cognitive Assessment/Intervention- PT/OT    Orientation Status (Cognition) oriented x 4  -KH     Follows Commands (Cognition) WNL  -     Personal Safety  Interventions fall prevention program maintained;nonskid shoes/slippers when out of bed  -[facility] Name 04/11/18 0904          Mobility Assessment/Treatment    Extremity Weight-bearing Status left upper extremity  -     Left Upper Extremity (Weight-bearing Status) weight-bearing as tolerated (WBAT)  -KH     Row Name 04/11/18 0904          Bed Mobility Assessment/Treatment    Bed Mobility Assessment/Treatment bed mobility (all) activities  -     Alba Level (Bed Mobility) independent  -KH     Row Name 04/11/18 0904          Transfer Assessment/Treatment    Transfer Assessment/Treatment sit-stand transfer  -     Sit-Stand Alba (Transfers) independent  -KH     Row Name 04/11/18 0904          Gait/Stairs Assessment/Training    Gait/Stairs Assessment/Training gait/ambulation independence  -     Alba Level (Gait) independent  -     Distance in Feet (Gait) 10  -KH     Row Name 04/11/18 0904          General ROM    GENERAL ROM COMMENTS WFL except L shoulder  -KH     Row Name 04/11/18 0904          General Assessment (Manual Muscle Testing)    Comment, General Manual Muscle Testing (MMT) Assessment WFL except LUE  -KH     Row Name 04/11/18 0904          Pain Assessment    Additional Documentation Pain Scale: Numbers Pre/Post-Treatment (Group)  -KH     Row Name 04/11/18 0904          Pain Scale: Numbers Pre/Post-Treatment    Pain Scale: Numbers, Pretreatment 0/10 - no pain  -     Martin Name             Wound 04/10/18 1016 Left arm incision    Wound - Properties Group Date first assessed: 04/10/18  -CS Time first assessed: 1016  -CS Side: Left  -CS Location: arm  -CS Type: incision  -CS    [facility] Name 04/11/18 0904          Plan of Care Review    Plan of Care Reviewed With patient  -KH     Row Name 04/11/18 0904          Physical Therapy Clinical Impression    Patient/Family Goals Statement (PT Clinical Impression) return home  -     Criteria for Skilled Interventions Met (PT Clinical  Impression) no problems identified which require skilled intervention  -CLIFTON     Row Name 04/11/18 0904          Positioning and Restraints    Pre-Treatment Position in bed   no alarm  -KH     Post Treatment Position other  -KH     Other Position --   standing at bedside  -CLIFTON       User Key  (r) = Recorded By, (t) = Taken By, (c) = Cosigned By    Initials Name Provider Type    CLIFTON Maciel, PT Physical Therapist    SANTI Myrick RN Registered Nurse              PT Recommendation and Plan  Therapy Frequency (PT Clinical Impression): evaluation only  Plan of Care Reviewed With: patient  Outcome Summary: PT is s/p L TSA. Pt was educated on HEP, post-op precautions and use of sling. Pt verbalized understanding and demonstrated HEP independently. Pt is safe to d/c home when medically stable. PT will sign off.          Outcome Measures     Row Name 04/11/18 0900             How much help from another person do you currently need...    Turning from your back to your side while in flat bed without using bedrails? 4  -KH      Moving from lying on back to sitting on the side of a flat bed without bedrails? 4  -KH      Moving to and from a bed to a chair (including a wheelchair)? 4  -KH      Standing up from a chair using your arms (e.g., wheelchair, bedside chair)? 4  -KH      Climbing 3-5 steps with a railing? 3  -KH      To walk in hospital room? 4  -KH      AM-PAC 6 Clicks Score 23  -KH         Functional Assessment    Outcome Measure Options AM-PAC 6 Clicks Basic Mobility (PT)  -        User Key  (r) = Recorded By, (t) = Taken By, (c) = Cosigned By    Initials Name Provider Type    CLIFTON Maciel, PT Physical Therapist           Time Calculation:         PT Charges     Row Name 04/11/18 0911             Time Calculation    Start Time 0850  -KH      Stop Time 0900  -KH      Time Calculation (min) 10 min  -CLIFTON        User Key  (r) = Recorded By, (t) = Taken By, (c) = Cosigned By    Initials Name  Provider Type     Madison Maciel, PT Physical Therapist          Therapy Charges for Today     Code Description Service Date Service Provider Modifiers Qty    64127846216 HC PT MOBILITY CURRENT 4/11/2018 Madison Maciel, PT GP, CI 1    04622867465 HC PT MOBILITY PROJECTED 4/11/2018 Madison Maciel, PT GP, CI 1    07404611291 HC PT MOBILITY DISCHARGE 4/11/2018 Madison Maciel, PT GP, CI 1    12269017313 HC PT EVAL LOW COMPLEXITY 1 4/11/2018 Madison Maciel, PT GP 1    23486276225 HC PT THER PROC EA 15 MIN 4/11/2018 Madison Maciel, PT GP 1          PT G-Codes  Outcome Measure Options: AM-PAC 6 Clicks Basic Mobility (PT)  Score: 23  Functional Limitation: Mobility: Walking and moving around  Mobility: Walking and Moving Around Current Status (): At least 1 percent but less than 20 percent impaired, limited or restricted  Mobility: Walking and Moving Around Goal Status (): At least 1 percent but less than 20 percent impaired, limited or restricted  Mobility: Walking and Moving Around Discharge Status (): At least 1 percent but less than 20 percent impaired, limited or restricted         Madison Maciel, PT  4/11/2018

## 2018-12-31 ENCOUNTER — APPOINTMENT (OUTPATIENT)
Dept: WOMENS IMAGING | Facility: HOSPITAL | Age: 58
End: 2018-12-31

## 2018-12-31 PROCEDURE — 77066 DX MAMMO INCL CAD BI: CPT | Performed by: RADIOLOGY

## 2018-12-31 PROCEDURE — 76641 ULTRASOUND BREAST COMPLETE: CPT | Performed by: RADIOLOGY

## 2018-12-31 PROCEDURE — 77062 BREAST TOMOSYNTHESIS BI: CPT | Performed by: RADIOLOGY

## 2018-12-31 PROCEDURE — G0279 TOMOSYNTHESIS, MAMMO: HCPCS | Performed by: RADIOLOGY

## 2021-07-09 ENCOUNTER — TELEPHONE (OUTPATIENT)
Dept: URGENT CARE | Facility: CLINIC | Age: 61
End: 2021-07-09

## 2021-07-09 NOTE — TELEPHONE ENCOUNTER
Called patient with radiologist read- radial head fracture with sail sign. Discussed with patient after verifying name and .  She has an ortho already that she will call to follow up.

## 2021-12-27 ENCOUNTER — APPOINTMENT (OUTPATIENT)
Dept: WOMENS IMAGING | Facility: HOSPITAL | Age: 61
End: 2021-12-27

## 2021-12-27 PROCEDURE — 77067 SCR MAMMO BI INCL CAD: CPT | Performed by: RADIOLOGY

## 2021-12-27 PROCEDURE — 77063 BREAST TOMOSYNTHESIS BI: CPT | Performed by: RADIOLOGY

## (undated) DEVICE — PAD,ABDOMINAL,8"X10",ST,LF: Brand: MEDLINE

## (undated) DEVICE — APPL CHLORAPREP W/TINT 26ML ORNG

## (undated) DEVICE — OCCLUSIVE GAUZE STRIP,3% BISMUTH TRIBROMOPHENATE IN PETROLATUM BLEND: Brand: XEROFORM

## (undated) DEVICE — DRAPE,REIN 53X77,STERILE: Brand: MEDLINE

## (undated) DEVICE — GLV SURG BIOGEL LTX PF 8

## (undated) DEVICE — GLV SURG TRIUMPH CLASSIC PF LTX 8 STRL

## (undated) DEVICE — SHEET, DRAPE, SPLIT, STERILE: Brand: MEDLINE

## (undated) DEVICE — PK SHLDR OPN 40

## (undated) DEVICE — TOWEL,OR,DSP,ST,BLUE,STD,4/PK,20PK/CS: Brand: MEDLINE

## (undated) DEVICE — SKIN PREP TRAY W/CHG: Brand: MEDLINE INDUSTRIES, INC.

## (undated) DEVICE — UNDYED BRAIDED (POLYGLACTIN 910), SYNTHETIC ABSORBABLE SUTURE: Brand: COATED VICRYL

## (undated) DEVICE — SOL ISO/ALC RUB 70PCT 4OZ

## (undated) DEVICE — GUIDEPIN SIMPLICITI 3X75MM STRL

## (undated) DEVICE — MAT FLR ABSORBENT LG 4FT 10 2.5FT

## (undated) DEVICE — DRAPE,U/ SHT,SPLIT,PLAS,STERIL: Brand: MEDLINE

## (undated) DEVICE — 3M™ STERI-STRIP™ REINFORCED ADHESIVE SKIN CLOSURES, R1547, 1/2 IN X 4 IN (12 MM X 100 MM), 6 STRIPS/ENVELOPE: Brand: 3M™ STERI-STRIP™

## (undated) DEVICE — SPNG GZ WOVN 4X4IN 12PLY 10/BX STRL

## (undated) DEVICE — GOWN,PREVENTION PLUS,XLONG/XLARGE,STRL: Brand: MEDLINE

## (undated) DEVICE — DRSNG BRDR MEPILEX P/OP SIL 4X8IN

## (undated) DEVICE — ENCORE® LATEX ORTHO SIZE 8, STERILE LATEX POWDER-FREE SURGICAL GLOVE: Brand: ENCORE

## (undated) DEVICE — SUT MNCRYL 3/0 PS2 18IN MCP497G

## (undated) DEVICE — DRSNG TELFA ILND ADH 4X6IN

## (undated) DEVICE — 3M™ IOBAN™ 2 ANTIMICROBIAL INCISE DRAPE 6650EZ: Brand: IOBAN™ 2

## (undated) DEVICE — GUIDEPIN AEQUALIS PERFORM PLS 2.5X220MM

## (undated) DEVICE — 2108 SERIES SAGITTAL BLADE (19.5 X 1.27 X 81.0MM)

## (undated) DEVICE — HEWSON SUTURE RETRIEVER: Brand: HEWSON SUTURE RETRIEVER

## (undated) DEVICE — CONTAINER,SPECIMEN,OR STERILE,4OZ: Brand: MEDLINE

## (undated) DEVICE — POOLE SUCTION HANDLE: Brand: CARDINAL HEALTH

## (undated) DEVICE — PIN